# Patient Record
Sex: MALE | Race: WHITE | NOT HISPANIC OR LATINO | Employment: OTHER | URBAN - NONMETROPOLITAN AREA
[De-identification: names, ages, dates, MRNs, and addresses within clinical notes are randomized per-mention and may not be internally consistent; named-entity substitution may affect disease eponyms.]

---

## 2023-10-09 ENCOUNTER — TELEPHONE (OUTPATIENT)
Dept: PAIN MEDICINE | Facility: HOSPITAL | Age: 78
End: 2023-10-09
Payer: MEDICARE

## 2023-10-09 PROBLEM — M70.61 GREATER TROCHANTERIC BURSITIS OF RIGHT HIP: Status: ACTIVE | Noted: 2023-10-09

## 2023-10-09 RX ORDER — SIMVASTATIN 20 MG/1
20 TABLET, FILM COATED ORAL NIGHTLY
COMMUNITY
Start: 2022-12-18 | End: 2023-12-06 | Stop reason: ALTCHOICE

## 2023-10-09 RX ORDER — AMLODIPINE BESYLATE 5 MG/1
5 TABLET ORAL DAILY
COMMUNITY
Start: 2023-02-17 | End: 2023-10-25

## 2023-10-09 NOTE — TELEPHONE ENCOUNTER
Pt called and said he has an appointment on the 25th but over the weekend he developed a trigger finger on his right hand  and would like to know if he could go to another physician if he could get in sooner,for a Cortizone injection  please advise.

## 2023-10-10 ENCOUNTER — OFFICE VISIT (OUTPATIENT)
Dept: ORTHOPEDIC SURGERY | Facility: CLINIC | Age: 78
End: 2023-10-10
Payer: MEDICARE

## 2023-10-10 DIAGNOSIS — M65.341 TRIGGER RING FINGER OF RIGHT HAND: Primary | ICD-10-CM

## 2023-10-10 PROCEDURE — 76942 ECHO GUIDE FOR BIOPSY: CPT | Performed by: ORTHOPAEDIC SURGERY

## 2023-10-10 PROCEDURE — 1125F AMNT PAIN NOTED PAIN PRSNT: CPT | Performed by: ORTHOPAEDIC SURGERY

## 2023-10-10 PROCEDURE — 1159F MED LIST DOCD IN RCRD: CPT | Performed by: ORTHOPAEDIC SURGERY

## 2023-10-10 PROCEDURE — 99203 OFFICE O/P NEW LOW 30 MIN: CPT | Performed by: ORTHOPAEDIC SURGERY

## 2023-10-10 PROCEDURE — 99213 OFFICE O/P EST LOW 20 MIN: CPT | Performed by: ORTHOPAEDIC SURGERY

## 2023-10-10 PROCEDURE — 1036F TOBACCO NON-USER: CPT | Performed by: ORTHOPAEDIC SURGERY

## 2023-10-10 PROCEDURE — 20550 NJX 1 TENDON SHEATH/LIGAMENT: CPT | Performed by: ORTHOPAEDIC SURGERY

## 2023-10-10 RX ORDER — METHYLPREDNISOLONE ACETATE 40 MG/ML
40 INJECTION, SUSPENSION INTRA-ARTICULAR; INTRALESIONAL; INTRAMUSCULAR; SOFT TISSUE ONCE
Status: DISCONTINUED | OUTPATIENT
Start: 2023-10-10 | End: 2023-12-06 | Stop reason: ALTCHOICE

## 2023-10-10 RX ORDER — ATORVASTATIN CALCIUM 20 MG/1
20 TABLET, FILM COATED ORAL
COMMUNITY

## 2023-10-10 ASSESSMENT — ENCOUNTER SYMPTOMS
SHORTNESS OF BREATH: 0
FEVER: 0
ARTHRALGIAS: 0
LIGHT-HEADEDNESS: 0
JOINT SWELLING: 0
COLOR CHANGE: 0
CHILLS: 0
WHEEZING: 0
FATIGUE: 0
TROUBLE SWALLOWING: 0

## 2023-10-10 ASSESSMENT — PAIN - FUNCTIONAL ASSESSMENT: PAIN_FUNCTIONAL_ASSESSMENT: 0-10

## 2023-10-10 ASSESSMENT — PAIN SCALES - GENERAL: PAINLEVEL_OUTOF10: 8

## 2023-10-10 NOTE — PROGRESS NOTES
Reason for Appointment  Chief Complaint   Patient presents with    Right Ring Finger - Trigger Finger     History of Present Illness  New patient is a 78 y.o. male here today for evaluation of of his right ring finger.  He had classic triggering symptoms for the last few months.  He is an avid golfer and the finger will lock down on him with heavy gripping and he has pain with gripping a club.  He has not had any trigger fingers in the past, no recent injuries or falls.  No numbness or tingling in the hand    History reviewed. No pertinent past medical history.    Past Surgical History:   Procedure Laterality Date    ELBOW SURGERY      ROTATOR CUFF REPAIR         Medication Documentation Review Audit       Reviewed by Leslie Dc MA (Medical Assistant) on 10/10/23 at 1015      Medication Order Taking? Sig Documenting Provider Last Dose Status   amLODIPine (Norvasc) 5 mg tablet [id number] Yes Take 1 tablet (5 mg) by mouth once daily. Historical Provider, MD Taking Active   atorvastatin (Lipitor) 20 mg tablet [id number]  Take 1 tablet (20 mg) by mouth once daily. Historical Provider, MD  Active   simvastatin (Zocor) 20 mg tablet [id number] No Take 1 tablet (20 mg) by mouth once daily at bedtime. Historical Provider, MD Not Taking Active                    No Known Allergies    Review of Systems   Constitutional:  Negative for chills, fatigue and fever.   HENT:  Negative for trouble swallowing.    Respiratory:  Negative for shortness of breath and wheezing.    Cardiovascular:  Negative for chest pain and leg swelling.   Musculoskeletal:  Negative for arthralgias and joint swelling.   Skin:  Negative for color change and rash.   Neurological:  Negative for syncope and light-headedness.       Exam   On exam patient is alert, oriented, and in no acute distress.  Head is normocephalic, no JVD, no auditory wheezes.  He has good motion of the shoulders, elbows, and wrist.  DJD in the hands with some stiffness with  composite flexion right hand greater than left.  He has active triggering of the right ring finger, tenderness over the right ring A1 pulley tendon sheath.  Good pulses and sensation in the upper extremity    Assessment   Right ring trigger finger    Plan     We discussed conservative treatment today with an injection and night splinting to calm down his symptoms.  We sterilely injected under ultrasound guidance Depo-Medrol lidocaine into the right ring finger A1 pulley tendon sheath.  Patient understands the small risk of infection and the signs look for as well as flare reaction.  Hopefully this gives him significant relief.  He can follow-up with us as needed    Written by Flores Michele saw, evaluated, and treated the patient with the PA    Procedures  After discussing the risks and benefits of the procedure we proceeded with the injection. Under ultrasound guidance we identified the metacarpal bone and overlying tendon sheath with the FDS and FDP tendons, images obtained. We then sterilely injected the right ring A1 pulley with a mixture of 20 mg of DepoMedrol and .5 cc of 1% lidocaine. Pt tolerated the procedure well without any adverse reactions.

## 2023-10-25 ENCOUNTER — OFFICE VISIT (OUTPATIENT)
Dept: PAIN MEDICINE | Facility: HOSPITAL | Age: 78
End: 2023-10-25
Payer: MEDICARE

## 2023-10-25 VITALS
TEMPERATURE: 97 F | HEART RATE: 60 BPM | SYSTOLIC BLOOD PRESSURE: 195 MMHG | DIASTOLIC BLOOD PRESSURE: 95 MMHG | BODY MASS INDEX: 26.52 KG/M2 | HEIGHT: 68 IN | WEIGHT: 175 LBS | RESPIRATION RATE: 17 BRPM

## 2023-10-25 DIAGNOSIS — Z79.899 MEDICATION MANAGEMENT: ICD-10-CM

## 2023-10-25 DIAGNOSIS — M47.817 FACET ARTHROPATHY, LUMBOSACRAL: ICD-10-CM

## 2023-10-25 DIAGNOSIS — M54.16 LUMBAR RADICULOPATHY, CHRONIC: Primary | ICD-10-CM

## 2023-10-25 DIAGNOSIS — M48.062 LUMBAR STENOSIS WITH NEUROGENIC CLAUDICATION: ICD-10-CM

## 2023-10-25 DIAGNOSIS — M54.50 CHRONIC BILATERAL LOW BACK PAIN WITHOUT SCIATICA: ICD-10-CM

## 2023-10-25 DIAGNOSIS — G89.29 CHRONIC BILATERAL LOW BACK PAIN WITHOUT SCIATICA: ICD-10-CM

## 2023-10-25 LAB
AMPHETAMINES UR QL SCN: NORMAL
BARBITURATES UR QL SCN: NORMAL
BENZODIAZ UR QL SCN: NORMAL
BZE UR QL SCN: NORMAL
CANNABINOIDS UR QL SCN: NORMAL
FENTANYL+NORFENTANYL UR QL SCN: NORMAL
OPIATES UR QL SCN: NORMAL
OXYCODONE+OXYMORPHONE UR QL SCN: NORMAL
PCP UR QL SCN: NORMAL

## 2023-10-25 PROCEDURE — 99213 OFFICE O/P EST LOW 20 MIN: CPT | Mod: ZK | Performed by: NURSE PRACTITIONER

## 2023-10-25 PROCEDURE — 1159F MED LIST DOCD IN RCRD: CPT | Performed by: NURSE PRACTITIONER

## 2023-10-25 PROCEDURE — 1036F TOBACCO NON-USER: CPT | Performed by: NURSE PRACTITIONER

## 2023-10-25 PROCEDURE — 1160F RVW MEDS BY RX/DR IN RCRD: CPT | Performed by: NURSE PRACTITIONER

## 2023-10-25 PROCEDURE — 80307 DRUG TEST PRSMV CHEM ANLYZR: CPT | Performed by: NURSE PRACTITIONER

## 2023-10-25 PROCEDURE — 99203 OFFICE O/P NEW LOW 30 MIN: CPT | Performed by: NURSE PRACTITIONER

## 2023-10-25 PROCEDURE — 1125F AMNT PAIN NOTED PAIN PRSNT: CPT | Performed by: NURSE PRACTITIONER

## 2023-10-25 RX ORDER — DORZOLAMIDE HYDROCHLORIDE AND TIMOLOL MALEATE 20; 5 MG/ML; MG/ML
1 SOLUTION/ DROPS OPHTHALMIC 2 TIMES DAILY
COMMUNITY

## 2023-10-25 RX ORDER — LOSARTAN POTASSIUM 50 MG/1
100 TABLET ORAL DAILY
COMMUNITY

## 2023-10-25 RX ORDER — CALCIUM CARBONATE/VITAMIN D3 250-3.125
1 TABLET ORAL
COMMUNITY

## 2023-10-25 RX ORDER — TRAVOPROST OPHTHALMIC SOLUTION 0.04 MG/ML
1 SOLUTION OPHTHALMIC NIGHTLY
COMMUNITY

## 2023-10-25 ASSESSMENT — ENCOUNTER SYMPTOMS
NEUROLOGICAL NEGATIVE: 1
MYALGIAS: 1
NECK PAIN: 0
NECK STIFFNESS: 0
CONSTITUTIONAL NEGATIVE: 1
JOINT SWELLING: 0
GASTROINTESTINAL NEGATIVE: 1
BACK PAIN: 1
PSYCHIATRIC NEGATIVE: 1
ALLERGIC/IMMUNOLOGIC NEGATIVE: 1
RESPIRATORY NEGATIVE: 1
EYES NEGATIVE: 1
CARDIOVASCULAR NEGATIVE: 1
ENDOCRINE NEGATIVE: 1
ARTHRALGIAS: 1

## 2023-10-25 NOTE — PROGRESS NOTES
I have personally reviewed the OARRS report for Johan Humphreys   . I have considered the risks of abuse, dependence, addiction and diversion.   Is the patient prescribed a combination of a benzodiazepine and opioid? No  Patient tolerating without AE. Benefit outweighs the risk. Discussed risks/benefits with patient. All questions answered. States understanding.     Date of the last Controlled Substance Agreement: 10/25/2023    Last urine drug screening date/ordered today: 10/25/23  Results of last screen: Results as expected.     OPIOID   What is the patient’s goal of therapy? Pain control.  Is this being achieved with current treatment? Yes  Attestation statement: I feel that it is clinically indicated to continue this current medication regimen after consideration of alternative therapies, and other non-opioid treatments.     Opioid Risk Screening:   THE OPIOID RISK TOOL (ORT)                               Female      Male  Alcohol                            [1]            [3] = 0  Illegal Drugs                           [2]            [3]  = 0  1. Family History of Substance Abuse  Prescription Drugs                           [4]           [4]  = 0  Alcohol                           [3]           [3]   =0  Illicit Drugs                           [4]           [4]   =  2. Personal History of Substance Abuse  Prescription Drugs                          [5]           [5]   =0  3. Age (If between 16 to 45)                          [1]           [1]   =0  4. History of Preadolescent Sexual Abuse                         [3]            [0]   =0  ADD, OCD, Bipolar, Schizophrenia                         [2]           [2]   =0  5. Psychological Disease  Depression                        [1]           [1]   =0    TOTAL Score =  0     Last opioid risk screening date/ordered today: 10/25/2023  Patient's total score is 0, within range of Low Risk (<= 3).     Reference :  Low Score = 0 to 3  Moderate Score = 4 to 7  High Score =  =8       Pain Scale Screening:   Pain Assessment and Documentation Tool (PADT)   Date of Assessment: 10/25/023  Analgesia:   Patient reports her pain level on average during the past week is 0on a 0 - 10 scale.

## 2023-10-25 NOTE — H&P (VIEW-ONLY)
Subjective   Patient ID: Johan Humphreys is a 78 y.o. male who presents for Pain (NP low back, right hip and leg ).    Johan is a pleasant 78-year-old  male who is here to establish care with pain management.  Patient is ambulatory and arrives with his spouse.  He was referred to us by Dr. Wood from [facility] orthopedic.    Patient has chronic lower back pain that goes down to his right leg.  Back pain has been ongoing for many years.  It is also aggravated with certain activities.  He reports right now he has no pain.  Pain comes and goes depending on his level of activities.  When he does have pain he describes it as aching, burning, sharp, shooting, spastic, and stabbing kind of pain.  Pain travels down to the side and back of his right leg. He mentioned that pain ends at the knee area.  He has  pain, stinging, pins and needle sensation to the right leg.  He has no radicular symptoms to the left leg.  He reports that back pain and leg pain is more so at night when he is sleeping.  He reports it wakes him up from a sound sleep.  He tends to lay on his sides.  He mentioned that he has to walk around before the pain goes away but then it comes back after an hour.   Patient denies leg weakness or change in balance.  He denies bowel or bladder incontinence.  He denies recent falls, injuries, or ER visits.  He denies back injury or surgery.  He denies joint replacement.    Patient reports that he used to have pain to his hips.  He was informed it was bursitis.  He had hip injections in the past.  It used to work until it did not.  He mentioned that he had seen 5 different orthopedic surgeons.  Dr. Wood ordered an MRI and he was told that it is coming from his back.    Patient is taking Advil as needed for pain relief.  He had physical therapy in the past and is continuing his home stretching exercise.  He also mentioned that he is fairly active and plays golf.    Patient had MRI of his lumbar spine  that was done 9/19/2023 and I reviewed the results with them using the spine model.  I discussed plan of care including pharmacologic and joint interventional procedure.  Patient does not want any medication.  I discussed lumbar epidural block and transforaminal DAVID.  Patient is in agreement to proceed to the TFESI.  This is a therapeutic procedure done under fluoroscopy.  Questions were answered during this encounter.          Past Medical History  He has no past medical history on file.    Surgical History  Past Surgical History:   Procedure Laterality Date    ELBOW SURGERY      ROTATOR CUFF REPAIR          Social History  He reports that he has quit smoking. His smoking use included cigarettes. He smoked an average of 2 packs per day. He has never used smokeless tobacco. He reports current alcohol use of about 24.0 standard drinks of alcohol per week. He reports that he does not use drugs.    Family History  No family history on file.     Allergies  Patient has no known allergies.      Current Outpatient Medications:     calcium carbonate-vitamin D3 (Oyster Shell) 250 mg-3.125 mcg (125 unit) tablet, Take 1 tablet by mouth 2 times a day with meals. Takes 5,000 units daily, Disp: , Rfl:     atorvastatin (Lipitor) 20 mg tablet, Take 1 tablet (20 mg) by mouth once daily., Disp: , Rfl:     dorzolamide-timoloL (Cosopt) 22.3-6.8 mg/mL ophthalmic solution, Administer 1 drop into both eyes 2 times a day., Disp: , Rfl:     losartan (Cozaar) 50 mg tablet, Take 2 tablets (100 mg) by mouth once daily., Disp: , Rfl:     simvastatin (Zocor) 20 mg tablet, Take 1 tablet (20 mg) by mouth once daily at bedtime., Disp: , Rfl:     travoprost (Travatan Z) 0.004 % drops ophthalmic solution, Administer 1 drop into both eyes once daily at bedtime., Disp: , Rfl:     Current Facility-Administered Medications:     methylPREDNISolone acetate (DEPO-Medrol) injection 40 mg, 40 mg, intramuscular, Once, Flores Clark PA-C     Review of  Systems   Constitutional: Negative.    HENT: Negative.     Eyes: Negative.    Respiratory: Negative.     Cardiovascular: Negative.    Gastrointestinal: Negative.    Endocrine: Negative.    Genitourinary: Negative.    Musculoskeletal:  Positive for arthralgias, back pain and myalgias. Negative for gait problem, joint swelling, neck pain and neck stiffness.   Skin: Negative.    Allergic/Immunologic: Negative.    Neurological: Negative.    Psychiatric/Behavioral: Negative.          Physical Exam  Vitals and nursing note reviewed.   HENT:      Head: Normocephalic.      Nose: Nose normal.   Eyes:      Extraocular Movements: Extraocular movements intact.      Conjunctiva/sclera: Conjunctivae normal.      Pupils: Pupils are equal, round, and reactive to light.   Cardiovascular:      Rate and Rhythm: Normal rate and regular rhythm.   Pulmonary:      Effort: Pulmonary effort is normal.      Breath sounds: Normal breath sounds.   Musculoskeletal:         General: Tenderness present. No swelling, deformity or signs of injury.      Cervical back: No rigidity or tenderness.      Lumbar back: Spasms and tenderness present.      Right lower leg: No edema.      Left lower leg: No edema.      Comments: No neck rigidity.  Full range of motion.  Negative Spurling test.  Negative for paraspinal tenderness at the cervical region.  BUE 5/5, BLE 4/5.  Negative leg raise.  Positive for minimal paraspinal tenderness at the lumbar region bilaterally at L4-L5, L5-S1 with rotation.  With right-sided radicular symptoms that follows L4, L5, S1 distribution.   Skin:     General: Skin is warm and dry.   Neurological:      General: No focal deficit present.      Mental Status: He is alert and oriented to person, place, and time.   Psychiatric:         Mood and Affect: Mood normal.         Behavior: Behavior normal.          Last Recorded Vitals  BP (!) 195/95 (BP Location: Right arm, Patient Position: Sitting, BP Cuff Size: Adult)   Pulse 60    Temp 36.1 °C (97 °F) (Tympanic)   Resp 17   Wt 79.4 kg (175 lb)     Relevant Results    Pain Management Panel          Latest Ref Rng & Units 10/25/2023   Pain Management Panel   Amphetamine Screen, Urine Presumptive Negative Presumptive Negative    Barbiturate Screen, Urine Presumptive Negative Presumptive Negative    Benzodiazepines Screen, Urine Presumptive Negative Presumptive Negative    Fentanyl Screen, Urine Presumptive Negative Presumptive Negative       --------------------  * * *Final Report* * *     DATE OF EXAM: Sep 19 2023  1:12PM      [facility]   0303  -  MRI LUMBAR SPINE WO IVCON  / ACCESSION #  [id number]     PROCEDURE REASON: LUMBAR DEGENERATIVE DISC DISEASE          * * * * Physician Interpretation * * * *      MRI LUMBAR SPINE WO IVCON     CLINICAL HISTORY:  LUMBAR DEGENERATIVE DISC DISEASE     TECHNIQUE:  MRI lumbar spine routine protocol without contrast.   MQ: [id number]     COMPARISON:  None.     RESULT:     Counting reference:  Lumbosacral junction. For the purposes of this   report, L4-5 is considered the level of the iliac crest and assume there   are 5 lumbar-type vertebrae.  Anatomic variant:  None.     Localizer images: Approximately 2.5 cm circumscribed T2 hyperintense   lesion in the right hepatic lobe. Few right-sided renal cysts.     Alignment:  Straightening of the cervical lordosis. Multilevel mild to   moderate intervertebral disc space narrowing.     Bone marrow signal/fracture: Type II degenerative endplate changes at   L2-3 and L3-4. No evidence of confluent abnormal marrow replacement or an   acute fracture.     Conus:  The conus terminates at L1 and is within normal limits of caliber   and morphology. Normal course and caliber of the descending cauda equina   nerve roots.     Soft tissues:  Paraspinal soft tissues are within normal limits.     T11-T12:  Canal and foramina are patent.     T12-L1:  Canal and foramina are patent.     L1-L2:  Annular bulging and facet and ligamentous  hypertrophy without   significant canal or foraminal compromise.     L2-L3:  Diffuse disc bulging with superimposed broad-based shallow disc   protrusion, facet and ligamentous hypertrophy; mild canal stenosis with   narrowing of each subarticular zone, mild bilateral foraminal stenosis.     L3-L4:  Diffuse disc bulging with superimposed broad-based shallow disc   protrusion, facet and ligamentous hypertrophy; patent canal with   narrowing of each subarticular zone, mild bilateral foraminal stenosis.     L4-L5:  Diffuse disc bulging with superimposed broad-based shallow disc   protrusion, facet and ligamentous hypertrophy; patent canal with   narrowing of each subarticular zone, moderate bilateral foraminal   stenosis.     L5-S1: Shallow disc bulging, facet hypertrophy; patent canal, moderate   right and mild left foraminal stenosis.     Sacrum and iliac wings:  The visualized sacrum and iliac wings are within   normal limits.  The presacral soft tissues are normal in appearance.   -------      Assessment/Plan   Problem List Items Addressed This Visit    None  Visit Diagnoses         Codes    Lumbar radiculopathy, chronic    -  Primary M54.16    Relevant Orders    Transforaminal    Lumbar stenosis with neurogenic claudication     M48.062    Relevant Orders    Transforaminal    Facet arthropathy, lumbosacral     M47.817    Chronic bilateral low back pain without sciatica     M54.50, G89.29    Medication management     Z79.899    Relevant Orders    Drug Screen, Urine With Reflex to Confirmation                       1. Lumbar radiculopathy, chronic  - Transforaminal; Future    2. Lumbar stenosis with neurogenic claudication  - Transforaminal; Future    3. Facet arthropathy, lumbosacral  Continue taking Advil as needed for pain relief.    4. Chronic bilateral low back pain without sciatica  Continue taking Advil as needed for pain relief.    5. Medication management  - Drug Screen, Urine With Reflex to Confirmation        Plan/Follow-up Instructions:     Continue taking Advil as needed for pain relief.    You are scheduled for right L4-L5 and L5-S1 TFESI #1 on 11/13/2023 in [place] with Dr. Rose.  No Advil on this day.  If getting sedation then you must not eat or drink 6 hours before the procedure and you must of a  with you.  The office will call you for further instructions.  You will then be seen for your postprocedure follow-up in 4 to 6 weeks.      Disclaimer: This note was created using voice recognition software. It was not corrected for typographical or grammatical errors, inadvertent word insertion, or any unintended errors. Please feel free to contact me for clarification.        Joleen Deras, GRANT, APRN, FNP-C   [facility]  Office #: [phone]  Fax # [phone]

## 2023-10-25 NOTE — PROGRESS NOTES
Subjective   Patient ID: Johan Humphreys is a 78 y.o. male who presents for Pain (NP low back, right hip and leg ).    Johan is a pleasant 78-year-old  male who is here to establish care with pain management.  Patient is ambulatory and arrives with his spouse.  He was referred to us by Dr. Wood from [facility].    Patient has chronic lower back pain that goes down to his right leg.  Back pain has been ongoing for many years.  It is also aggravated with certain activities.  He reports right now he has no pain.  Pain comes and goes depending on his level of activities.  When he does have pain he describes it as aching, burning, sharp, shooting, spastic, and stabbing kind of pain.  Pain travels down to the side and back of his right leg. He mentioned that pain ends at the knee area.  He has  pain, stinging, pins and needle sensation to the right leg.  He has no radicular symptoms to the left leg.  He reports that back pain and leg pain is more so at night when he is sleeping.  He reports it wakes him up from a sound sleep.  He tends to lay on his sides.  He mentioned that he has to walk around before the pain goes away but then it comes back after an hour.   Patient denies leg weakness or change in balance.  He denies bowel or bladder incontinence.  He denies recent falls, injuries, or ER visits.  He denies back injury or surgery.  He denies joint replacement.    Patient reports that he used to have pain to his hips.  He was informed it was bursitis.  He had hip injections in the past.  It used to work until it did not.  He mentioned that he had seen 5 different orthopedic surgeons.  Dr. Wood ordered an MRI and he was told that it is coming from his back.    Patient is taking Advil as needed for pain relief.  He had physical therapy in the past and is continuing his home stretching exercise.  He also mentioned that he is fairly active and plays golf.    Patient had MRI of his lumbar spine  that was done 9/19/2023 and I reviewed the results with them using the spine model.  I discussed plan of care including pharmacologic and joint interventional procedure.  Patient does not want any medication.  I discussed lumbar epidural block and transforaminal DAVID.  Patient is in agreement to proceed to the TFESI.  This is a therapeutic procedure done under fluoroscopy.  Questions were answered during this encounter.          Past Medical History  He has no past medical history on file.    Surgical History  Past Surgical History:   Procedure Laterality Date    ELBOW SURGERY      ROTATOR CUFF REPAIR          Social History  He reports that he has quit smoking. His smoking use included cigarettes. He smoked an average of 2 packs per day. He has never used smokeless tobacco. He reports current alcohol use of about 24.0 standard drinks of alcohol per week. He reports that he does not use drugs.    Family History  No family history on file.     Allergies  Patient has no known allergies.      Current Outpatient Medications:     calcium carbonate-vitamin D3 (Oyster Shell) 250 mg-3.125 mcg (125 unit) tablet, Take 1 tablet by mouth 2 times a day with meals. Takes 5,000 units daily, Disp: , Rfl:     atorvastatin (Lipitor) 20 mg tablet, Take 1 tablet (20 mg) by mouth once daily., Disp: , Rfl:     dorzolamide-timoloL (Cosopt) 22.3-6.8 mg/mL ophthalmic solution, Administer 1 drop into both eyes 2 times a day., Disp: , Rfl:     losartan (Cozaar) 50 mg tablet, Take 2 tablets (100 mg) by mouth once daily., Disp: , Rfl:     simvastatin (Zocor) 20 mg tablet, Take 1 tablet (20 mg) by mouth once daily at bedtime., Disp: , Rfl:     travoprost (Travatan Z) 0.004 % drops ophthalmic solution, Administer 1 drop into both eyes once daily at bedtime., Disp: , Rfl:     Current Facility-Administered Medications:     methylPREDNISolone acetate (DEPO-Medrol) injection 40 mg, 40 mg, intramuscular, Once, Flores Clark PA-C     Review of  Systems   Constitutional: Negative.    HENT: Negative.     Eyes: Negative.    Respiratory: Negative.     Cardiovascular: Negative.    Gastrointestinal: Negative.    Endocrine: Negative.    Genitourinary: Negative.    Musculoskeletal:  Positive for arthralgias, back pain and myalgias. Negative for gait problem, joint swelling, neck pain and neck stiffness.   Skin: Negative.    Allergic/Immunologic: Negative.    Neurological: Negative.    Psychiatric/Behavioral: Negative.          Physical Exam  Vitals and nursing note reviewed.   HENT:      Head: Normocephalic.      Nose: Nose normal.   Eyes:      Extraocular Movements: Extraocular movements intact.      Conjunctiva/sclera: Conjunctivae normal.      Pupils: Pupils are equal, round, and reactive to light.   Cardiovascular:      Rate and Rhythm: Normal rate and regular rhythm.   Pulmonary:      Effort: Pulmonary effort is normal.      Breath sounds: Normal breath sounds.   Musculoskeletal:         General: Tenderness present. No swelling, deformity or signs of injury.      Cervical back: No rigidity or tenderness.      Lumbar back: Spasms and tenderness present.      Right lower leg: No edema.      Left lower leg: No edema.      Comments: No neck rigidity.  Full range of motion.  Negative Spurling test.  Negative for paraspinal tenderness at the cervical region.  BUE 5/5, BLE 4/5.  Negative leg raise.  Positive for minimal paraspinal tenderness at the lumbar region bilaterally at L4-L5, L5-S1 with rotation.  With right-sided radicular symptoms that follows L4, L5, S1 distribution.   Skin:     General: Skin is warm and dry.   Neurological:      General: No focal deficit present.      Mental Status: He is alert and oriented to person, place, and time.   Psychiatric:         Mood and Affect: Mood normal.         Behavior: Behavior normal.          Last Recorded Vitals  BP (!) 195/95 (BP Location: Right arm, Patient Position: Sitting, BP Cuff Size: Adult)   Pulse 60    Temp 36.1 °C (97 °F) (Tympanic)   Resp 17   Wt 79.4 kg (175 lb)     Relevant Results    Pain Management Panel          Latest Ref Rng & Units 10/25/2023   Pain Management Panel   Amphetamine Screen, Urine Presumptive Negative Presumptive Negative    Barbiturate Screen, Urine Presumptive Negative Presumptive Negative    Benzodiazepines Screen, Urine Presumptive Negative Presumptive Negative    Fentanyl Screen, Urine Presumptive Negative Presumptive Negative       --------------------  * * *Final Report* * *     DATE OF EXAM: Sep 19 2023  1:12PM      [facility]   0303  -  MRI LUMBAR SPINE WO IVCON  / ACCESSION #  [id number]     PROCEDURE REASON: LUMBAR DEGENERATIVE DISC DISEASE          * * * * Physician Interpretation * * * *      MRI LUMBAR SPINE WO IVCON     CLINICAL HISTORY:  LUMBAR DEGENERATIVE DISC DISEASE     TECHNIQUE:  MRI lumbar spine routine protocol without contrast.   MQ: [id number]     COMPARISON:  None.     RESULT:     Counting reference:  Lumbosacral junction. For the purposes of this   report, L4-5 is considered the level of the iliac crest and assume there   are 5 lumbar-type vertebrae.  Anatomic variant:  None.     Localizer images: Approximately 2.5 cm circumscribed T2 hyperintense   lesion in the right hepatic lobe. Few right-sided renal cysts.     Alignment:  Straightening of the cervical lordosis. Multilevel mild to   moderate intervertebral disc space narrowing.     Bone marrow signal/fracture: Type II degenerative endplate changes at   L2-3 and L3-4. No evidence of confluent abnormal marrow replacement or an   acute fracture.     Conus:  The conus terminates at L1 and is within normal limits of caliber   and morphology. Normal course and caliber of the descending cauda equina   nerve roots.     Soft tissues:  Paraspinal soft tissues are within normal limits.     T11-T12:  Canal and foramina are patent.     T12-L1:  Canal and foramina are patent.     L1-L2:  Annular bulging and facet and ligamentous  hypertrophy without   significant canal or foraminal compromise.     L2-L3:  Diffuse disc bulging with superimposed broad-based shallow disc   protrusion, facet and ligamentous hypertrophy; mild canal stenosis with   narrowing of each subarticular zone, mild bilateral foraminal stenosis.     L3-L4:  Diffuse disc bulging with superimposed broad-based shallow disc   protrusion, facet and ligamentous hypertrophy; patent canal with   narrowing of each subarticular zone, mild bilateral foraminal stenosis.     L4-L5:  Diffuse disc bulging with superimposed broad-based shallow disc   protrusion, facet and ligamentous hypertrophy; patent canal with   narrowing of each subarticular zone, moderate bilateral foraminal   stenosis.     L5-S1: Shallow disc bulging, facet hypertrophy; patent canal, moderate   right and mild left foraminal stenosis.     Sacrum and iliac wings:  The visualized sacrum and iliac wings are within   normal limits.  The presacral soft tissues are normal in appearance.   -------      Assessment/Plan   Problem List Items Addressed This Visit    None  Visit Diagnoses         Codes    Lumbar radiculopathy, chronic    -  Primary M54.16    Relevant Orders    Transforaminal    Lumbar stenosis with neurogenic claudication     M48.062    Relevant Orders    Transforaminal    Facet arthropathy, lumbosacral     M47.817    Chronic bilateral low back pain without sciatica     M54.50, G89.29    Medication management     Z79.899    Relevant Orders    Drug Screen, Urine With Reflex to Confirmation                       1. Lumbar radiculopathy, chronic  - Transforaminal; Future    2. Lumbar stenosis with neurogenic claudication  - Transforaminal; Future    3. Facet arthropathy, lumbosacral  Continue taking Advil as needed for pain relief.    4. Chronic bilateral low back pain without sciatica  Continue taking Advil as needed for pain relief.    5. Medication management  - Drug Screen, Urine With Reflex to Confirmation        Plan/Follow-up Instructions:     Continue taking Advil as needed for pain relief.    You are scheduled for right L4-L5 and L5-S1 TFESI #1 on 11/13/2023 in [place] with Dr. Rose.  No Advil on this day.  If getting sedation then you must not eat or drink 6 hours before the procedure and you must of a  with you.  The office will call you for further instructions.  You will then be seen for your postprocedure follow-up in 4 to 6 weeks.      Disclaimer: This note was created using voice recognition software. It was not corrected for typographical or grammatical errors, inadvertent word insertion, or any unintended errors. Please feel free to contact me for clarification.        Joleen Deras, GRANT, APRN, FNP-C   [facility]  Office #: [phone]  Fax # [phone]

## 2023-10-25 NOTE — PATIENT INSTRUCTIONS
Continue taking Advil as needed for pain relief.    You are scheduled for right L4-L5 and L5-S1 TFESI #1 on 11/13/2023 in [place] with Dr. Rose.  No Advil on this day.  If getting sedation then you must not eat or drink 6 hours before the procedure and you must of a  with you.  The office will call you for further instructions.  You will then be seen for your postprocedure follow-up in 4 to 6 weeks.

## 2023-11-09 PROBLEM — M54.16 CHRONIC LUMBAR RADICULOPATHY: Status: ACTIVE | Noted: 2023-11-09

## 2023-11-09 PROBLEM — M48.062 LUMBAR STENOSIS WITH NEUROGENIC CLAUDICATION: Status: ACTIVE | Noted: 2023-11-09

## 2023-11-13 ENCOUNTER — APPOINTMENT (OUTPATIENT)
Dept: PAIN MEDICINE | Facility: HOSPITAL | Age: 78
End: 2023-11-13
Payer: MEDICARE

## 2023-11-13 ENCOUNTER — HOSPITAL ENCOUNTER (OUTPATIENT)
Dept: PAIN MEDICINE | Facility: HOSPITAL | Age: 78
Discharge: HOME | End: 2023-11-13
Payer: MEDICARE

## 2023-11-13 ENCOUNTER — HOSPITAL ENCOUNTER (OUTPATIENT)
Dept: RADIOLOGY | Facility: HOSPITAL | Age: 78
Discharge: HOME | End: 2023-11-13
Payer: MEDICARE

## 2023-11-13 VITALS
BODY MASS INDEX: 26.52 KG/M2 | SYSTOLIC BLOOD PRESSURE: 131 MMHG | WEIGHT: 175 LBS | OXYGEN SATURATION: 94 % | DIASTOLIC BLOOD PRESSURE: 80 MMHG | HEART RATE: 60 BPM | RESPIRATION RATE: 18 BRPM | HEIGHT: 68 IN | TEMPERATURE: 98.4 F

## 2023-11-13 DIAGNOSIS — M54.16 LUMBAR RADICULOPATHY, CHRONIC: ICD-10-CM

## 2023-11-13 DIAGNOSIS — M48.062 LUMBAR STENOSIS WITH NEUROGENIC CLAUDICATION: ICD-10-CM

## 2023-11-13 DIAGNOSIS — M54.16 CHRONIC LUMBAR RADICULOPATHY: Primary | ICD-10-CM

## 2023-11-13 DIAGNOSIS — M54.16 LUMBAR RADICULOPATHY: Primary | ICD-10-CM

## 2023-11-13 PROCEDURE — 2550000001 HC RX 255 CONTRASTS: Performed by: ANESTHESIOLOGY

## 2023-11-13 PROCEDURE — 7100000010 HC PHASE TWO TIME - EACH INCREMENTAL 1 MINUTE

## 2023-11-13 PROCEDURE — 7100000009 HC PHASE TWO TIME - INITIAL BASE CHARGE

## 2023-11-13 PROCEDURE — 64483 NJX AA&/STRD TFRM EPI L/S 1: CPT | Performed by: ANESTHESIOLOGY

## 2023-11-13 PROCEDURE — 77003 FLUOROGUIDE FOR SPINE INJECT: CPT

## 2023-11-13 PROCEDURE — 64483 NJX AA&/STRD TFRM EPI L/S 1: CPT

## 2023-11-13 PROCEDURE — 2500000004 HC RX 250 GENERAL PHARMACY W/ HCPCS (ALT 636 FOR OP/ED): Performed by: ANESTHESIOLOGY

## 2023-11-13 PROCEDURE — 64484 NJX AA&/STRD TFRM EPI L/S EA: CPT

## 2023-11-13 PROCEDURE — 64484 NJX AA&/STRD TFRM EPI L/S EA: CPT | Performed by: ANESTHESIOLOGY

## 2023-11-13 PROCEDURE — 2500000004 HC RX 250 GENERAL PHARMACY W/ HCPCS (ALT 636 FOR OP/ED): Performed by: NURSE PRACTITIONER

## 2023-11-13 RX ORDER — SODIUM CHLORIDE, SODIUM LACTATE, POTASSIUM CHLORIDE, CALCIUM CHLORIDE 600; 310; 30; 20 MG/100ML; MG/100ML; MG/100ML; MG/100ML
100 INJECTION, SOLUTION INTRAVENOUS CONTINUOUS
Status: DISCONTINUED | OUTPATIENT
Start: 2023-11-13 | End: 2023-11-14 | Stop reason: HOSPADM

## 2023-11-13 RX ORDER — MIDAZOLAM HYDROCHLORIDE 1 MG/ML
INJECTION, SOLUTION INTRAMUSCULAR; INTRAVENOUS AS NEEDED
Status: COMPLETED | OUTPATIENT
Start: 2023-11-13 | End: 2023-11-13

## 2023-11-13 RX ORDER — AMLODIPINE BESYLATE 5 MG/1
5 TABLET ORAL DAILY
COMMUNITY

## 2023-11-13 RX ORDER — FENTANYL CITRATE 50 UG/ML
INJECTION, SOLUTION INTRAMUSCULAR; INTRAVENOUS AS NEEDED
Status: COMPLETED | OUTPATIENT
Start: 2023-11-13 | End: 2023-11-13

## 2023-11-13 RX ADMIN — FENTANYL CITRATE 100 MCG: 50 INJECTION, SOLUTION INTRAMUSCULAR; INTRAVENOUS at 12:05

## 2023-11-13 RX ADMIN — IOHEXOL 3 ML: 240 INJECTION, SOLUTION INTRATHECAL; INTRAVASCULAR; INTRAVENOUS; ORAL at 12:10

## 2023-11-13 RX ADMIN — MIDAZOLAM 2 MG: 1 INJECTION INTRAMUSCULAR; INTRAVENOUS at 12:05

## 2023-11-13 RX ADMIN — SODIUM CHLORIDE, POTASSIUM CHLORIDE, SODIUM LACTATE AND CALCIUM CHLORIDE 100 ML/HR: 600; 310; 30; 20 INJECTION, SOLUTION INTRAVENOUS at 11:31

## 2023-11-13 ASSESSMENT — PAIN - FUNCTIONAL ASSESSMENT
PAIN_FUNCTIONAL_ASSESSMENT: 0-10
PAIN_FUNCTIONAL_ASSESSMENT: 0-10

## 2023-11-13 ASSESSMENT — COLUMBIA-SUICIDE SEVERITY RATING SCALE - C-SSRS
6. HAVE YOU EVER DONE ANYTHING, STARTED TO DO ANYTHING, OR PREPARED TO DO ANYTHING TO END YOUR LIFE?: NO
2. HAVE YOU ACTUALLY HAD ANY THOUGHTS OF KILLING YOURSELF?: NO
1. IN THE PAST MONTH, HAVE YOU WISHED YOU WERE DEAD OR WISHED YOU COULD GO TO SLEEP AND NOT WAKE UP?: NO

## 2023-11-13 ASSESSMENT — PAIN SCALES - GENERAL
PAINLEVEL_OUTOF10: 3
PAINLEVEL_OUTOF10: 0 - NO PAIN

## 2023-11-13 ASSESSMENT — PAIN DESCRIPTION - DESCRIPTORS: DESCRIPTORS: BURNING;STABBING

## 2023-11-13 NOTE — OP NOTE
Date: 2023  OR Location: CON NON-OR PROCEDURES    Name: Johan Humphreys, : 1945, Age: 78 y.o., MRN: [id number], Sex: male    Diagnosis   1. Chronic lumbar radiculopathy        2. Lumbar stenosis with neurogenic claudication  Transforaminal    Transforaminal      3. Lumbar radiculopathy, chronic  Transforaminal    Transforaminal        Patient has no changes in health medical management or allergies since last visit on 10/25/2023    Preop diagnosis: Lumbosacral radiculopathy  Postop diagnosis: Same  Operation performed:  #1  Right L4-5 L5-S1 transforaminal epidural steroid injection with fluoroscopic guidance.   #2 epidurography    Procedures   ANESTHESIA: IV conscious sedation  ESTIMATED BLOOD LOSS: None  COMPLICATIONS: None  PROCEDURE: A 22-gauge IV was started in the patient's left hand. The patient was taken to the operating room, placed in the prone position where sterile prep and drapes were done. Supplemental oxygen was given to the patient at 2 L per minute. Blood pressure and pulse ox remained stable throughout the case. Using fluoroscopic guidance, a single 25-gauge 3-1/2 inch spinal needle was inserted into the neural foramen at L4 and L5 on the right.  A cross table lateral identified the needle tip at the vertebral body. Aspiration was negative of cerebrospinal fluid or blood.  One mL of Omnipaque 300 mg contrast media was injected into each spinal needle. The L4 and L5 nerve roots and epidural space was visualized in both AP and each lateral views with the fluoroscope. One mL of 2% xylocaine MPF and 6 mg of Celestone was injected into each of the 2 needles. The needles were then removed and sterile bandage was applied along with Neosporin ointment to the puncture sites. The patient was taken to the recovery room with no neurologic deficits or pain noted. The patient is scheduled for a follow-up visit.    The patient tolerated the procedure very well and was transferred to the Recovery Room in  stable condition.  The patient had stable resolution of symptoms.  Patient to follow-up in the [facility] as scheduled.

## 2023-11-13 NOTE — DISCHARGE INSTRUCTIONS
No heavy lifting or strenuous activity today.  Do not sign important documents.  Do not drive for 24 hours.  Keep bandage on for 24 hours.  Keep injection site clean and dry, it may be sore for up to 48 hours.  Do not smoke or drink alcohol for 24 hours.   For relief of pain and swelling, you may apply ice to the injection site area.  If pain persist you may apply moist heat.  Common side effects of steroids include insomnia, facial flushing, increased appetite, headaches, sweating, fluid retention. If you have diabetes your blood sugar may increase. Please monitor your diet and your blood sugar should return to normal in a few days. If your sugar becomes dangerously high, please contact your physician that manages your diabetes for further guidance.  Rare side effects include infection, bleeding, nerve damage. If you have fever, redness or swelling near site, severe pain, please go to the nearest emergency room. For other questions please call office at [phone]. Local anesthetics wear off in several hours and duration of relief vary from person to person.

## 2023-11-28 ENCOUNTER — TELEPHONE (OUTPATIENT)
Dept: PAIN MEDICINE | Facility: HOSPITAL | Age: 78
End: 2023-11-28
Payer: MEDICARE

## 2023-12-06 ENCOUNTER — OFFICE VISIT (OUTPATIENT)
Dept: PAIN MEDICINE | Facility: HOSPITAL | Age: 78
End: 2023-12-06
Payer: MEDICARE

## 2023-12-06 VITALS
WEIGHT: 179 LBS | BODY MASS INDEX: 27.13 KG/M2 | HEIGHT: 68 IN | HEART RATE: 77 BPM | SYSTOLIC BLOOD PRESSURE: 144 MMHG | DIASTOLIC BLOOD PRESSURE: 85 MMHG | TEMPERATURE: 97.5 F

## 2023-12-06 DIAGNOSIS — M47.817 FACET ARTHROPATHY, LUMBOSACRAL: ICD-10-CM

## 2023-12-06 DIAGNOSIS — M48.062 LUMBAR STENOSIS WITH NEUROGENIC CLAUDICATION: ICD-10-CM

## 2023-12-06 DIAGNOSIS — M54.16 LUMBAR RADICULOPATHY, CHRONIC: Primary | ICD-10-CM

## 2023-12-06 PROCEDURE — 99214 OFFICE O/P EST MOD 30 MIN: CPT | Mod: ZK | Performed by: NURSE PRACTITIONER

## 2023-12-06 RX ORDER — GABAPENTIN 100 MG/1
100 CAPSULE ORAL NIGHTLY
Qty: 90 CAPSULE | Refills: 0 | Status: SHIPPED | OUTPATIENT
Start: 2023-12-06 | End: 2024-01-24

## 2023-12-06 ASSESSMENT — ENCOUNTER SYMPTOMS
MYALGIAS: 1
ALLERGIC/IMMUNOLOGIC NEGATIVE: 1
NECK STIFFNESS: 0
CONSTITUTIONAL NEGATIVE: 1
JOINT SWELLING: 0
GASTROINTESTINAL NEGATIVE: 1
EYES NEGATIVE: 1
PSYCHIATRIC NEGATIVE: 1
RESPIRATORY NEGATIVE: 1
CARDIOVASCULAR NEGATIVE: 1
NECK PAIN: 0
NEUROLOGICAL NEGATIVE: 1
ARTHRALGIAS: 1
ENDOCRINE NEGATIVE: 1
BACK PAIN: 1

## 2023-12-06 ASSESSMENT — PAIN SCALES - GENERAL: PAINLEVEL: 10-WORST PAIN EVER

## 2023-12-06 NOTE — H&P (VIEW-ONLY)
Subjective   Patient ID: Johan Humphreys is a 78 y.o. male who presents for Pain (Post procedure follow up).    Johan is a pleasant 78-year-old  male who is here for postprocedure follow-up.  Patient is ambulatory.  Gait is steady.  He arrives with his spouse.    Patient had right L4-L5 and L5-S1 TFESI on 11/13/2023.  The procedure has improved his back pain, leg pain and hip pain.  He reports it provided 100% relief for the first 10 days then slowly wearing off.  He reports he had 80% for 3 weeks.  The injection has improved his pain, sleep, ability to participate in his daily activities, and ability to enjoy social activities.  He reports he was less grumpy as he is able to get a good night sleep.  He mentioned that before the injection he used to get a good sleep while in the recliner.  After the injection he was able to sleep in his own bed and did not suffer back and leg pain while turning to the sides.    Patient reports he continues to have right-sided leg pain that is mostly at night.  It comes and goes depending on his position.  He reports at night his pain is a 10.  It is tolerable in the morning.  He describes it as burning, sharp, shooting and stabbing kind of pain.  He continues to have numbness and stinging sensation to his right leg.  He has no pain, numbness or tingling sensation to his left leg.  He reports prolonged standing or walking aggravates his back and right leg pain.  He can only stand for 10 minutes and has to sit down.  Back and leg pain interferes with his daily activities.  Patient denies leg weakness or change in balance.  He denies bowel or bladder incontinence.  He denies recent falls, injuries, or ER visits.  There has been no change since he was last seen.    Patient takes Tylenol and ibuprofen for pain relief. He had physical therapy in the past and is continuing his home stretching exercise.      I discussed the plan of care including pharmacologic and joint interventional  procedure.  He is in agreement to repeating the transforaminal DAVID under fluoroscopy.  Questions were answered during this encounter.    VALERIE was done today and he scored 30.  This form was scanned and included in this note.    --------------  11/13/2023: Right L4-L5 and L5-S1 TFESI #1  --------        Past Medical History  He has a past medical history of High cholesterol and HTN (hypertension).    Surgical History  Past Surgical History:   Procedure Laterality Date    CATARACT EXTRACTION, BILATERAL Bilateral     ELBOW SURGERY      KNEE ARTHROSCOPY W/ DEBRIDEMENT Bilateral     ROTATOR CUFF REPAIR          Social History  He reports that he has quit smoking. His smoking use included cigarettes. He smoked an average of 2 packs per day. He has never used smokeless tobacco. He reports current alcohol use of about 24.0 standard drinks of alcohol per week. He reports that he does not use drugs.    Family History  No family history on file.     Allergies  Patient has no known allergies.      Current Outpatient Medications:     amLODIPine (Norvasc) 5 mg tablet, Take 1 tablet (5 mg) by mouth once daily., Disp: , Rfl:     atorvastatin (Lipitor) 20 mg tablet, Take 1 tablet (20 mg) by mouth once daily., Disp: , Rfl:     calcium carbonate-vitamin D3 (Oyster Shell) 250 mg-3.125 mcg (125 unit) tablet, Take 1 tablet by mouth 2 times a day with meals. Takes 5,000 units daily, Disp: , Rfl:     dorzolamide-timoloL (Cosopt) 22.3-6.8 mg/mL ophthalmic solution, Administer 1 drop into both eyes 2 times a day., Disp: , Rfl:     losartan (Cozaar) 50 mg tablet, Take 2 tablets (100 mg) by mouth once daily., Disp: , Rfl:     travoprost (Travatan Z) 0.004 % drops ophthalmic solution, Administer 1 drop into both eyes once daily at bedtime., Disp: , Rfl:     gabapentin (Neurontin) 100 mg capsule, Take 1 capsule (100 mg) by mouth once daily at bedtime., Disp: 90 capsule, Rfl: 0  No current facility-administered medications for this visit.      Review of Systems   Constitutional: Negative.    HENT: Negative.     Eyes: Negative.    Respiratory: Negative.     Cardiovascular: Negative.    Gastrointestinal: Negative.    Endocrine: Negative.    Genitourinary: Negative.    Musculoskeletal:  Positive for arthralgias, back pain and myalgias. Negative for gait problem, joint swelling, neck pain and neck stiffness.   Skin: Negative.    Allergic/Immunologic: Negative.    Neurological: Negative.    Psychiatric/Behavioral: Negative.          Physical Exam  Vitals and nursing note reviewed.   HENT:      Head: Normocephalic.      Nose: Nose normal.   Eyes:      Extraocular Movements: Extraocular movements intact.      Conjunctiva/sclera: Conjunctivae normal.      Pupils: Pupils are equal, round, and reactive to light.   Cardiovascular:      Rate and Rhythm: Normal rate and regular rhythm.   Pulmonary:      Effort: Pulmonary effort is normal.      Breath sounds: Normal breath sounds.   Musculoskeletal:         General: Tenderness present. No swelling, deformity or signs of injury.      Cervical back: No rigidity or tenderness.      Lumbar back: Tenderness present.      Right lower leg: No edema.      Left lower leg: No edema.      Comments: Positive right leg raise eliciting back pain.  Negative left leg raise.  Positive for minimal paraspinal tenderness at the lumbar region bilaterally at L4-L5, L5-S1 with rotation.  With right-sided radicular symptoms that follows L4, L5 and S1 distribution.  BUE 5/5, LLE 5/5, RLE 4/5.   Skin:     General: Skin is warm and dry.   Neurological:      General: No focal deficit present.      Mental Status: He is alert and oriented to person, place, and time.   Psychiatric:         Mood and Affect: Mood normal.         Behavior: Behavior normal.          Last Recorded Vitals  /85   Pulse 77   Temp 36.4 °C (97.5 °F) (Temporal)   Wt 81.2 kg (179 lb)     Relevant Results      Pain Management Panel          Latest Ref Rng & Units  10/25/2023   Pain Management Panel   Amphetamine Screen, Urine Presumptive Negative Presumptive Negative    Barbiturate Screen, Urine Presumptive Negative Presumptive Negative    Benzodiazepines Screen, Urine Presumptive Negative Presumptive Negative    Fentanyl Screen, Urine Presumptive Negative Presumptive Negative              Media Information          ------------------  MRI LUMBAR SPINE WO IVCON  Order: [id number]  Impression    IMPRESSION:    Lumbar spondylosis without high-grade canal or foraminal compromise, as  detailed.    Anatomic Lumbar Variant: None. L4-5 is considered the level of the iliac  crest and assume there are 5 lumbar-type vertebrae.          : PSCB    Transcribe Date/Time: Sep 19 2023  1:16P    Dictated by : MARSHA MARINA MD    This examination was interpreted and the report reviewed and  electronically signed by:  MARSHA MARINA MD on Sep 19 2023  1:21PM  EST  Narrative    * * *Final Report* * *    DATE OF EXAM: Sep 19 2023  1:12PM      [facility]   0303  -  MRI LUMBAR SPINE WO IVCON  / ACCESSION #  [id number]    PROCEDURE REASON: LUMBAR DEGENERATIVE DISC DISEASE        * * * * Physician Interpretation * * * *     MRI LUMBAR SPINE WO IVCON    CLINICAL HISTORY:  LUMBAR DEGENERATIVE DISC DISEASE    TECHNIQUE:  MRI lumbar spine routine protocol without contrast.  MQ: [id number]    COMPARISON:  None.    RESULT:    Counting reference:  Lumbosacral junction. For the purposes of this  report, L4-5 is considered the level of the iliac crest and assume there  are 5 lumbar-type vertebrae.  Anatomic variant:  None.    Localizer images: Approximately 2.5 cm circumscribed T2 hyperintense  lesion in the right hepatic lobe. Few right-sided renal cysts.    Alignment:  Straightening of the cervical lordosis. Multilevel mild to  moderate intervertebral disc space narrowing.    Bone marrow signal/fracture: Type II degenerative endplate changes at  L2-3 and L3-4. No evidence of confluent  abnormal marrow replacement or an  acute fracture.    Conus:  The conus terminates at L1 and is within normal limits of caliber  and morphology. Normal course and caliber of the descending cauda equina  nerve roots.    Soft tissues:  Paraspinal soft tissues are within normal limits.    T11-T12:  Canal and foramina are patent.    T12-L1:  Canal and foramina are patent.    L1-L2:  Annular bulging and facet and ligamentous hypertrophy without  significant canal or foraminal compromise.    L2-L3:  Diffuse disc bulging with superimposed broad-based shallow disc  protrusion, facet and ligamentous hypertrophy; mild canal stenosis with  narrowing of each subarticular zone, mild bilateral foraminal stenosis.    L3-L4:  Diffuse disc bulging with superimposed broad-based shallow disc  protrusion, facet and ligamentous hypertrophy; patent canal with  narrowing of each subarticular zone, mild bilateral foraminal stenosis.    L4-L5:  Diffuse disc bulging with superimposed broad-based shallow disc  protrusion, facet and ligamentous hypertrophy; patent canal with  narrowing of each subarticular zone, moderate bilateral foraminal  stenosis.    L5-S1:  Shallow disc bulging, facet hypertrophy; patent canal, moderate  right and mild left foraminal stenosis.    Sacrum and iliac wings:  The visualized sacrum and iliac wings are within  normal limits.  The presacral soft tissues are normal in appearance.  ----------------------      Assessment/Plan   Problem List Items Addressed This Visit             ICD-10-CM    Lumbar radiculopathy, chronic - Primary M54.16    Relevant Medications    gabapentin (Neurontin) 100 mg capsule    Other Relevant Orders    Transforaminal    Lumbar stenosis with neurogenic claudication M48.062    Relevant Medications    gabapentin (Neurontin) 100 mg capsule    Other Relevant Orders    Transforaminal    Facet arthropathy, lumbosacral M47.817    Relevant Medications    gabapentin (Neurontin) 100 mg capsule               1. Lumbar radiculopathy, chronic  - gabapentin (Neurontin) 100 mg capsule; Take 1 capsule (100 mg) by mouth once daily at bedtime.  Dispense: 90 capsule; Refill: 0  - Transforaminal; Future    2. Lumbar stenosis with neurogenic claudication  - gabapentin (Neurontin) 100 mg capsule; Take 1 capsule (100 mg) by mouth once daily at bedtime.  Dispense: 90 capsule; Refill: 0  - Transforaminal; Future    3. Facet arthropathy, lumbosacral  - gabapentin (Neurontin) 100 mg capsule; Take 1 capsule (100 mg) by mouth once daily at bedtime.  Dispense: 90 capsule; Refill: 0       Plan/Follow-up Instructions:      started you on gabapentin and you may take 100 mg at bedtime.    Continue taking ibuprofen as needed for pain relief.    You are scheduled for right L4-L5 and L5-S1 TFESI #2 on 12/18/2023 in [place] with Dr. Rose.  No ibuprofen on this day.  If getting sedation then you must not eat or drink 6 hours before the procedure and you must have a  with you.  The office will call you for further instructions.  I will then see you for your postprocedure follow-up in 4 to 6 weeks.      Disclaimer: This note was created using voice recognition software. It was not corrected for typographical or grammatical errors, inadvertent word insertion, or any unintended errors. Please feel free to contact me for clarification.        Joleen Deras, GRANT, APRN, FNP-C   [facility]  Office #: [phone]  Fax # [phone]

## 2023-12-06 NOTE — PATIENT INSTRUCTIONS
I started you on gabapentin and you may take 100 mg at bedtime.    Continue taking ibuprofen as needed for pain relief.    You are scheduled for right L4-L5 and L5-S1 TFESI #2 on 12/18/2023 in [place] with Dr. Rose.  No ibuprofen on this day.  If getting sedation then you must not eat or drink 6 hours before the procedure and you must have a  with you.  The office will call you for further instructions.  I will then see you for your postprocedure follow-up in 4 to 6 weeks.   Complex Repair And A-T Advancement Flap Text: The defect edges were debeveled with a #15 scalpel blade.  The primary defect was closed partially with a complex linear closure.  Given the location of the remaining defect, shape of the defect and the proximity to free margins an A-T advancement flap was deemed most appropriate for complete closure of the defect.  Using a sterile surgical marker, an appropriate advancement flap was drawn incorporating the defect and placing the expected incisions within the relaxed skin tension lines where possible.    The area thus outlined was incised deep to adipose tissue with a #15 scalpel blade.  The skin margins were undermined to an appropriate distance in all directions utilizing iris scissors.

## 2023-12-06 NOTE — PROGRESS NOTES
I have personally reviewed the OARRS report for Johan Humphreys   . I have considered the risks of abuse, dependence, addiction and diversion.   Is the patient prescribed a combination of a benzodiazepine and opioid? No  Patient tolerating without AE. Benefit outweighs the risk. Discussed risks/benefits with patient. All questions answered. States understanding.     Date of the last Controlled Substance Agreement: 10/25/2023       Last urine drug screening date/ordered today: 10/25/2023     Results of last screen: Results as expected.       Last opioid risk screening date/ordered today: 12/6/2023      Pain Scale Screening:   Pain Assessment and Documentation Tool (PADT)   Date of Assessment: 12/6/20223  Analgesia:   Patient reports her pain level on average during the past week is 10on a 0 - 10 scale.   Patient reports that her pain level at its worst during the past week was 10 on a 0 -10 scale.   50% of pain has been relieved during the past week per patient   Patient states that the amount of pain relief she is now obtaining from her current pain reliever(s) is enough to make a real difference in her life.   Query to clinician: Is the patient's pain relief clinically significant? yes  Activities of Daily Living:   Physical functioning: unchanged  Family relationships: unchanged  Social relationships: unchanged  Mood: unchanged  Sleep patterns: unchanged  Overall functioning: unchanged  Adverse Events: No, Johan Humphreys is not experiencing side effects from current pain reliever.  Patients overall severity of side effect:none  Specific Analgesic Plan: Continue present regimen.

## 2023-12-18 ENCOUNTER — HOSPITAL ENCOUNTER (OUTPATIENT)
Dept: PAIN MEDICINE | Facility: HOSPITAL | Age: 78
Discharge: HOME | End: 2023-12-18
Payer: MEDICARE

## 2023-12-18 ENCOUNTER — APPOINTMENT (OUTPATIENT)
Dept: PAIN MEDICINE | Facility: HOSPITAL | Age: 78
End: 2023-12-18
Payer: MEDICARE

## 2023-12-18 ENCOUNTER — HOSPITAL ENCOUNTER (OUTPATIENT)
Dept: RADIOLOGY | Facility: HOSPITAL | Age: 78
Discharge: HOME | End: 2023-12-18
Payer: MEDICARE

## 2023-12-18 VITALS
HEART RATE: 68 BPM | TEMPERATURE: 96.5 F | SYSTOLIC BLOOD PRESSURE: 154 MMHG | OXYGEN SATURATION: 98 % | DIASTOLIC BLOOD PRESSURE: 92 MMHG | RESPIRATION RATE: 18 BRPM

## 2023-12-18 DIAGNOSIS — M54.16 LUMBAR RADICULOPATHY, CHRONIC: ICD-10-CM

## 2023-12-18 DIAGNOSIS — M48.062 LUMBAR STENOSIS WITH NEUROGENIC CLAUDICATION: ICD-10-CM

## 2023-12-18 PROCEDURE — 64483 NJX AA&/STRD TFRM EPI L/S 1: CPT | Performed by: ANESTHESIOLOGY

## 2023-12-18 PROCEDURE — 2550000001 HC RX 255 CONTRASTS: Performed by: ANESTHESIOLOGY

## 2023-12-18 PROCEDURE — 64483 NJX AA&/STRD TFRM EPI L/S 1: CPT

## 2023-12-18 PROCEDURE — 64484 NJX AA&/STRD TFRM EPI L/S EA: CPT

## 2023-12-18 PROCEDURE — 77003 FLUOROGUIDE FOR SPINE INJECT: CPT

## 2023-12-18 PROCEDURE — 64484 NJX AA&/STRD TFRM EPI L/S EA: CPT | Performed by: ANESTHESIOLOGY

## 2023-12-18 RX ORDER — SODIUM CHLORIDE, SODIUM LACTATE, POTASSIUM CHLORIDE, CALCIUM CHLORIDE 600; 310; 30; 20 MG/100ML; MG/100ML; MG/100ML; MG/100ML
100 INJECTION, SOLUTION INTRAVENOUS CONTINUOUS
Status: DISCONTINUED | OUTPATIENT
Start: 2023-12-18 | End: 2023-12-19 | Stop reason: HOSPADM

## 2023-12-18 RX ADMIN — IOHEXOL 3 ML: 240 INJECTION, SOLUTION INTRATHECAL; INTRAVASCULAR; INTRAVENOUS; ORAL at 11:50

## 2023-12-18 ASSESSMENT — PAIN SCALES - GENERAL
PAINLEVEL_OUTOF10: 0 - NO PAIN
PAINLEVEL_OUTOF10: 3

## 2023-12-18 ASSESSMENT — COLUMBIA-SUICIDE SEVERITY RATING SCALE - C-SSRS
2. HAVE YOU ACTUALLY HAD ANY THOUGHTS OF KILLING YOURSELF?: NO
6. HAVE YOU EVER DONE ANYTHING, STARTED TO DO ANYTHING, OR PREPARED TO DO ANYTHING TO END YOUR LIFE?: NO
1. IN THE PAST MONTH, HAVE YOU WISHED YOU WERE DEAD OR WISHED YOU COULD GO TO SLEEP AND NOT WAKE UP?: NO

## 2023-12-18 ASSESSMENT — PAIN - FUNCTIONAL ASSESSMENT
PAIN_FUNCTIONAL_ASSESSMENT: 0-10
PAIN_FUNCTIONAL_ASSESSMENT: 0-10

## 2023-12-18 NOTE — DISCHARGE INSTRUCTIONS
Discharge Instructions:  Keep band-aid on for the next 24 hours.  No showering/bathing for the next 24 hours. You may notice soreness or increased pain in the area of your injection, which may continue for the first 48 hours.  Avoid driving or operating any heavy machinery until the next day after the procedure.  You should resume any medications and your regular diet after the procedure. Some of the side affects you may experience from the steroids are: Insomnia (inability to sleep), Increased sweating, Headaches, Increased fluid retention (swelling of your extremities), Increased appetite, Face flushing, If you are a diabetic, your blood sugars may go up.  Closely monitor your diet.  Your blood sugar should return to normal in a few days.  Complications: If the discomfort persists, apply moist heat to the area.  Serious complications are very uncommon but may include bleeding, infection or nerve damage. If sever pain, fever, redness or swelling near the injection site, have someone take you to the nearest emergency room to be evaluated for procedure complications or infection. Expectations: Local anesthetics wear off in several hours but duration of relief varies from individual to individual.  If you have any questions, please call the office at [phone].  If this is an emergency, please go to the nearest emergency room.

## 2023-12-18 NOTE — OP NOTE
Date: 2023  OR Location: CON NON-OR PROCEDURES    Name: Johan Humphreys, : 1945, Age: 78 y.o., MRN: [id number], Sex: male    Diagnosis   1. Lumbar radiculopathy, chronic  Transforaminal    Transforaminal      2. Lumbar stenosis with neurogenic claudication  Transforaminal    Transforaminal        Patient has no changes in health medical management or allergies since last visit on 2023    Preop diagnosis: Lumbosacral radiculopathy  Postop diagnosis: Same  Operation performed:  #1  Right L4-5 and L5-S1 transforaminal epidural steroid injection with fluoroscopic guidance.   #2 epidurography    Procedures   ANESTHESIA: Local  ESTIMATED BLOOD LOSS: None  COMPLICATIONS: None  PROCEDURE: A 22-gauge IV was started in the patient's left hand. The patient was taken to the operating room, placed in the prone position where sterile prep and drapes were done. Supplemental oxygen was given to the patient at 2 L per minute. Blood pressure and pulse ox remained stable throughout the case. Using fluoroscopic guidance, a single 25-gauge 3-1/2 inch spinal needle was inserted into the neural foramen at L4 and L5 on the right.  A cross table lateral identified the needle tip at the vertebral body. Aspiration was negative of cerebrospinal fluid or blood.  One mL of Omnipaque 300 mg contrast media was injected into each spinal needle. The L4 and L5 nerve roots and epidural space was visualized in both AP and each lateral views with the fluoroscope. One mL of 2% xylocaine MPF and 6 mg of Celestone was injected into each of the 2 needles. The needles were then removed and sterile bandage was applied along with Neosporin ointment to the puncture sites. The patient was taken to the recovery room with no neurologic deficits or pain noted. The patient is scheduled for a follow-up visit.    The patient tolerated the procedure very well and was transferred to the Recovery Room in stable condition.  The patient had stable resolution  of symptoms.  Patient to follow-up in the [facility] as scheduled.

## 2024-01-24 ENCOUNTER — OFFICE VISIT (OUTPATIENT)
Dept: PAIN MEDICINE | Facility: HOSPITAL | Age: 79
End: 2024-01-24
Payer: MEDICARE

## 2024-01-24 VITALS
BODY MASS INDEX: 27.43 KG/M2 | DIASTOLIC BLOOD PRESSURE: 88 MMHG | WEIGHT: 181 LBS | HEIGHT: 68 IN | SYSTOLIC BLOOD PRESSURE: 171 MMHG | HEART RATE: 71 BPM | TEMPERATURE: 98.1 F

## 2024-01-24 DIAGNOSIS — Z79.899 MEDICATION MANAGEMENT: ICD-10-CM

## 2024-01-24 DIAGNOSIS — M79.10 TRIGGER POINT: ICD-10-CM

## 2024-01-24 DIAGNOSIS — M70.61 GREATER TROCHANTERIC BURSITIS OF RIGHT HIP: Primary | ICD-10-CM

## 2024-01-24 PROBLEM — M79.18 MYOFASCIAL PAIN: Status: ACTIVE | Noted: 2024-01-24

## 2024-01-24 PROCEDURE — 80307 DRUG TEST PRSMV CHEM ANLYZR: CPT | Performed by: NURSE PRACTITIONER

## 2024-01-24 PROCEDURE — 1159F MED LIST DOCD IN RCRD: CPT | Performed by: NURSE PRACTITIONER

## 2024-01-24 PROCEDURE — 1036F TOBACCO NON-USER: CPT | Performed by: NURSE PRACTITIONER

## 2024-01-24 PROCEDURE — 99213 OFFICE O/P EST LOW 20 MIN: CPT | Mod: ZK | Performed by: NURSE PRACTITIONER

## 2024-01-24 PROCEDURE — 1125F AMNT PAIN NOTED PAIN PRSNT: CPT | Performed by: NURSE PRACTITIONER

## 2024-01-24 PROCEDURE — 99213 OFFICE O/P EST LOW 20 MIN: CPT | Performed by: NURSE PRACTITIONER

## 2024-01-24 PROCEDURE — 1160F RVW MEDS BY RX/DR IN RCRD: CPT | Performed by: NURSE PRACTITIONER

## 2024-01-24 ASSESSMENT — PAIN SCALES - GENERAL: PAINLEVEL: 6

## 2024-01-24 ASSESSMENT — ENCOUNTER SYMPTOMS
GASTROINTESTINAL NEGATIVE: 1
ENDOCRINE NEGATIVE: 1
RESPIRATORY NEGATIVE: 1
ALLERGIC/IMMUNOLOGIC NEGATIVE: 1
MYALGIAS: 1
EYES NEGATIVE: 1
NECK PAIN: 0
CONSTITUTIONAL NEGATIVE: 1
NECK STIFFNESS: 0
ARTHRALGIAS: 1
JOINT SWELLING: 0
PSYCHIATRIC NEGATIVE: 1
CARDIOVASCULAR NEGATIVE: 1
NEUROLOGICAL NEGATIVE: 1

## 2024-01-24 NOTE — H&P (VIEW-ONLY)
Subjective   Patient ID: Johan Humphreys is a 78 y.o. male who presents for Follow-up (Post procedure follow up).    Johan is a pleasant 78-year-old  male who is here for postprocedure follow-up.  Patient's ambulatory.  Gait is steady.  He arrives with his spouse.    Patient had right L4-L5 and L5-S1 TFESI #2 on 12/18/2022.  Patient reports the injection did not help with the right leg pain.  He had 60% relief for several days then he is back to where he was.    Patient continues to have right hip pain.  He rates his pain as 6 out of 10 during the daytime but is worse during the night.  He reports it is 10 out of 10.  He describes it as burning, sharp, shooting, spastic and stabbing kind of pain.  He has stinging sensation to the right hip area.  He denies pain, numbness or tingling sensation to his legs.  He denies leg weakness or change in balance.  He denies bowel or bladder incontinence.  He denies recent falls, injuries, or ER visits.  There has been no changes since he was last seen.    Patient continues to take Advil as needed for pain relief.  He was on gabapentin but stopped taking that.  He reports it is not working.  He would like an injection to relieve the right bursitis pain.  He had an injection for his bursitis in the past that worked well.  Questions were answered during this encounter.              Past Medical History  He has a past medical history of High cholesterol and HTN (hypertension).    Surgical History  Past Surgical History:   Procedure Laterality Date    CATARACT EXTRACTION, BILATERAL Bilateral     ELBOW SURGERY      KNEE ARTHROSCOPY W/ DEBRIDEMENT Bilateral     ROTATOR CUFF REPAIR          Social History  He reports that he has quit smoking. His smoking use included cigarettes. He smoked an average of 2 packs per day. He has never used smokeless tobacco. He reports current alcohol use of about 24.0 standard drinks of alcohol per week. He reports that he does not use  drugs.    Family History  No family history on file.     Allergies  Patient has no known allergies.      Current Outpatient Medications:     amLODIPine (Norvasc) 5 mg tablet, Take 1 tablet (5 mg) by mouth once daily., Disp: , Rfl:     atorvastatin (Lipitor) 20 mg tablet, Take 1 tablet (20 mg) by mouth once daily., Disp: , Rfl:     calcium carbonate-vitamin D3 (Oyster Shell) 250 mg-3.125 mcg (125 unit) tablet, Take 1 tablet by mouth 2 times a day with meals. Takes 5,000 units daily, Disp: , Rfl:     dorzolamide-timoloL (Cosopt) 22.3-6.8 mg/mL ophthalmic solution, Administer 1 drop into both eyes 2 times a day., Disp: , Rfl:     losartan (Cozaar) 50 mg tablet, Take 2 tablets (100 mg) by mouth once daily., Disp: , Rfl:     travoprost (Travatan Z) 0.004 % drops ophthalmic solution, Administer 1 drop into both eyes once daily at bedtime., Disp: , Rfl:      Review of Systems   Constitutional: Negative.    HENT: Negative.     Eyes: Negative.    Respiratory: Negative.     Cardiovascular: Negative.    Gastrointestinal: Negative.    Endocrine: Negative.    Genitourinary: Negative.    Musculoskeletal:  Positive for arthralgias and myalgias. Negative for gait problem, joint swelling, neck pain and neck stiffness.   Skin: Negative.    Allergic/Immunologic: Negative.    Neurological: Negative.    Psychiatric/Behavioral: Negative.          Physical Exam  Vitals and nursing note reviewed.   HENT:      Head: Normocephalic.      Nose: Nose normal.   Eyes:      Extraocular Movements: Extraocular movements intact.      Conjunctiva/sclera: Conjunctivae normal.      Pupils: Pupils are equal, round, and reactive to light.   Cardiovascular:      Rate and Rhythm: Normal rate and regular rhythm.   Pulmonary:      Effort: Pulmonary effort is normal.      Breath sounds: Normal breath sounds.   Musculoskeletal:         General: Tenderness present. No swelling, deformity or signs of injury.      Cervical back: No rigidity or tenderness.       Lumbar back: Tenderness present.      Right lower leg: No edema.      Left lower leg: No edema.      Comments: Negative leg raise.  Positive for right Fabere's test eliciting hip pain.  Negative for paraspinal tenderness at the lumbar region.  Negative for tenderness at the left femoral hip joint.  Positive for tenderness on palpation at the right femoral hip joint.  BUE 4-5/5, BLE 4-5/5.   Skin:     General: Skin is warm and dry.   Neurological:      General: No focal deficit present.      Mental Status: He is alert and oriented to person, place, and time.   Psychiatric:         Mood and Affect: Mood normal.         Behavior: Behavior normal.          Last Recorded Vitals  /88   Pulse 71   Temp 36.7 °C (98.1 °F) (Temporal)   Wt 82.1 kg (181 lb)     Relevant Results      Pain Management Panel  More data may exist         Latest Ref Rng & Units 1/24/2024 10/25/2023   Pain Management Panel   Amphetamine Screen, Urine Presumptive Negative Presumptive Negative  Presumptive Negative    Barbiturate Screen, Urine Presumptive Negative Presumptive Negative  Presumptive Negative    Benzodiazepines Screen, Urine Presumptive Negative Presumptive Negative  Presumptive Negative    Fentanyl Screen, Urine Presumptive Negative Presumptive Negative  Presumptive Negative         ---------------------  XR HIP GENERAL 3V PELV/AP/LAT RT  Order: [id number]  Impression    IMPRESSION:    Mild right hip degenerative change.          : NIKKO    Transcribe Date/Time: Sep  2 2022  9:42A    Dictated by : STELLA RODRIGUEZ DO    This examination was interpreted and the report reviewed and  electronically signed by:   STELLA RODRIGUEZ DO on Sep  2 2022  9:44AM  EST  Narrative    * * *Final Report* * *    DATE OF EXAM: Sep  2 2022  9:07AM      AEX   [id number]  -  XR HIP 3V PELV+ AP/LAT RT  / ACCESSION #  [id number]    PROCEDURE REASON: RIGHT HIP PAIN        * * * * Physician Interpretation * * * *     EXAMINATION:  XR HIP 3V PELV+  AP/LAT RT    PATIENT/TECHNOLOGIST PROVIDED HISTORY:   RT HIP PAIN    CLINICAL INFORMATION:  RIGHT HIP PAIN    TECHNIQUE: Pelvis, 1 view; Right hip, 2 views    COMPARISON: None    RESULT:    Mild right hip joint space narrowing inferiorly.  No acute right hip  fracture or dislocation.    Partially evaluated left hip appears similar to the right.    Rounded ossification/enthesophyte adjacent to the right greater  trochanter.  Bony pelvis appears grossly intact.  Sacroiliac joints  appear relatively maintained.  Incompletely evaluated degenerative  changes of the lower lumbar spine.  ------------------      Assessment/Plan   Problem List Items Addressed This Visit             ICD-10-CM    Greater trochanteric bursitis of right hip - Primary M70.61    Relevant Orders    FL pain management TC    Inject Trigger Point, 1 or 2    Trigger point M79.10    Relevant Orders    Inject Trigger Point, 1 or 2     Other Visit Diagnoses         Codes    Medication management     Z79.899    Relevant Orders    Drug Screen, Urine With Reflex to Confirmation (Completed)                   1. Greater trochanteric bursitis of right hip  - FL pain management TC; Future  - Inject Trigger Point, 1 or 2; Future    2. Trigger point  - Inject Trigger Point, 1 or 2; Future    3. Medication management  - Drug Screen, Urine With Reflex to Confirmation; Future  - Drug Screen, Urine With Reflex to Confirmation       Plan/Follow-up Instructions:     You are scheduled for right trochanteric bursitis trigger point injection with fluoroscopy on 1/29/2024 in [place] with Dr. Rose.  No ibuprofen this day.  The office will call you for further instructions.  You will then be seen for your postprocedure follow-up in 6 to 8 weeks.      Disclaimer: This note was created using voice recognition software. It was not corrected for typographical or grammatical errors, inadvertent word insertion, or any unintended errors. Please feel free to contact me for  clarification.        Joleen Deras, GRANT, APRN, FNP-C   [facility]  Office #: [phone]  Fax # [phone]

## 2024-01-24 NOTE — PATIENT INSTRUCTIONS
You are scheduled for right trochanteric bursitis trigger point injection with fluoroscopy on 1/29/2024 in [place] with Dr. Rose.  No ibuprofen this day.  The office will call you for further instructions.  You will then be seen for your postprocedure follow-up in 6 to 8 weeks.

## 2024-01-24 NOTE — PROGRESS NOTES
I have personally reviewed the OARRS report for Johan Humphreys   . I have considered the risks of abuse, dependence, addiction and diversion.   Is the patient prescribed a combination of a benzodiazepine and opioid? No  Patient tolerating without AE. Benefit outweighs the risk. Discussed risks/benefits with patient. All questions answered. States understanding.     Date of the last Controlled Substance Agreement: 10/25/2023       Last urine drug screening date/ordered today: 01/24/24     Results of last screen: Results as expected.       Last opioid risk screening date/ordered today: 10/25/2023      Pain Scale Screening:   Pain Assessment and Documentation Tool (PADT)   Date of Assessment: 1/24/2024  Analgesia:   Patient reports her pain level on average during the past week is 10on a 0 - 10 scale.   Patient reports that her pain level at its worst during the past week was 10 on a 0 -10 scale.   60% of pain has been relieved during the past week per patient   Patient states that the amount of pain relief she is now obtaining from her current pain reliever(s) is enough to make a real difference in her life.   Query to clinician: Is the patient's pain relief clinically significant? no  Activities of Dnoaily Living:   Physical functioning: worse  Family relationships: unchanged  Social relationships: unchanged  Mood: worse  Sleep patterns: worse  Overall functioning: worse  Adverse Events: No, Johan Humphreys is not experiencing side effects from current pain reliever.  Patients overall severity of side effect:none  Specific Analgesic Plan: Continue present regimen.

## 2024-01-24 NOTE — PROGRESS NOTES
Subjective   Patient ID: Johan Humphreys is a 78 y.o. male who presents for Follow-up (Post procedure follow up).    Johan is a pleasant 78-year-old  male who is here for postprocedure follow-up.  Patient's ambulatory.  Gait is steady.  He arrives with his spouse.    Patient had right L4-L5 and L5-S1 TFESI #2 on 12/18/2022.  Patient reports the injection did not help with the right leg pain.  He had 60% relief for several days then he is back to where he was.    Patient continues to have right hip pain.  He rates his pain as 6 out of 10 during the daytime but is worse during the night.  He reports it is 10 out of 10.  He describes it as burning, sharp, shooting, spastic and stabbing kind of pain.  He has stinging sensation to the right hip area.  He denies pain, numbness or tingling sensation to his legs.  He denies leg weakness or change in balance.  He denies bowel or bladder incontinence.  He denies recent falls, injuries, or ER visits.  There has been no changes since he was last seen.    Patient continues to take Advil as needed for pain relief.  He was on gabapentin but stopped taking that.  He reports it is not working.  He would like an injection to relieve the right bursitis pain.  He had an injection for his bursitis in the past that worked well.  Questions were answered during this encounter.              Past Medical History  He has a past medical history of High cholesterol and HTN (hypertension).    Surgical History  Past Surgical History:   Procedure Laterality Date    CATARACT EXTRACTION, BILATERAL Bilateral     ELBOW SURGERY      KNEE ARTHROSCOPY W/ DEBRIDEMENT Bilateral     ROTATOR CUFF REPAIR          Social History  He reports that he has quit smoking. His smoking use included cigarettes. He smoked an average of 2 packs per day. He has never used smokeless tobacco. He reports current alcohol use of about 24.0 standard drinks of alcohol per week. He reports that he does not use  drugs.    Family History  No family history on file.     Allergies  Patient has no known allergies.      Current Outpatient Medications:     amLODIPine (Norvasc) 5 mg tablet, Take 1 tablet (5 mg) by mouth once daily., Disp: , Rfl:     atorvastatin (Lipitor) 20 mg tablet, Take 1 tablet (20 mg) by mouth once daily., Disp: , Rfl:     calcium carbonate-vitamin D3 (Oyster Shell) 250 mg-3.125 mcg (125 unit) tablet, Take 1 tablet by mouth 2 times a day with meals. Takes 5,000 units daily, Disp: , Rfl:     dorzolamide-timoloL (Cosopt) 22.3-6.8 mg/mL ophthalmic solution, Administer 1 drop into both eyes 2 times a day., Disp: , Rfl:     losartan (Cozaar) 50 mg tablet, Take 2 tablets (100 mg) by mouth once daily., Disp: , Rfl:     travoprost (Travatan Z) 0.004 % drops ophthalmic solution, Administer 1 drop into both eyes once daily at bedtime., Disp: , Rfl:      Review of Systems   Constitutional: Negative.    HENT: Negative.     Eyes: Negative.    Respiratory: Negative.     Cardiovascular: Negative.    Gastrointestinal: Negative.    Endocrine: Negative.    Genitourinary: Negative.    Musculoskeletal:  Positive for arthralgias and myalgias. Negative for gait problem, joint swelling, neck pain and neck stiffness.   Skin: Negative.    Allergic/Immunologic: Negative.    Neurological: Negative.    Psychiatric/Behavioral: Negative.          Physical Exam  Vitals and nursing note reviewed.   HENT:      Head: Normocephalic.      Nose: Nose normal.   Eyes:      Extraocular Movements: Extraocular movements intact.      Conjunctiva/sclera: Conjunctivae normal.      Pupils: Pupils are equal, round, and reactive to light.   Cardiovascular:      Rate and Rhythm: Normal rate and regular rhythm.   Pulmonary:      Effort: Pulmonary effort is normal.      Breath sounds: Normal breath sounds.   Musculoskeletal:         General: Tenderness present. No swelling, deformity or signs of injury.      Cervical back: No rigidity or tenderness.       Lumbar back: Tenderness present.      Right lower leg: No edema.      Left lower leg: No edema.      Comments: Negative leg raise.  Positive for right Fabere's test eliciting hip pain.  Negative for paraspinal tenderness at the lumbar region.  Negative for tenderness at the left femoral hip joint.  Positive for tenderness on palpation at the right femoral hip joint.  BUE 4-5/5, BLE 4-5/5.   Skin:     General: Skin is warm and dry.   Neurological:      General: No focal deficit present.      Mental Status: He is alert and oriented to person, place, and time.   Psychiatric:         Mood and Affect: Mood normal.         Behavior: Behavior normal.          Last Recorded Vitals  /88   Pulse 71   Temp 36.7 °C (98.1 °F) (Temporal)   Wt 82.1 kg (181 lb)     Relevant Results      Pain Management Panel  More data may exist         Latest Ref Rng & Units 1/24/2024 10/25/2023   Pain Management Panel   Amphetamine Screen, Urine Presumptive Negative Presumptive Negative  Presumptive Negative    Barbiturate Screen, Urine Presumptive Negative Presumptive Negative  Presumptive Negative    Benzodiazepines Screen, Urine Presumptive Negative Presumptive Negative  Presumptive Negative    Fentanyl Screen, Urine Presumptive Negative Presumptive Negative  Presumptive Negative         ---------------------  XR HIP GENERAL 3V PELV/AP/LAT RT  Order: [id number]  Impression    IMPRESSION:    Mild right hip degenerative change.          : NIKKO    Transcribe Date/Time: Sep  2 2022  9:42A    Dictated by : STELLA RODRIGUEZ DO    This examination was interpreted and the report reviewed and  electronically signed by:   STELLA RODRIGUEZ DO on Sep  2 2022  9:44AM  EST  Narrative    * * *Final Report* * *    DATE OF EXAM: Sep  2 2022  9:07AM      AEX   5352  -  XR HIP 3V PELV+ AP/LAT RT  / ACCESSION #  [id number]    PROCEDURE REASON: RIGHT HIP PAIN        * * * * Physician Interpretation * * * *     EXAMINATION:  XR HIP 3V PELV+  AP/LAT RT    PATIENT/TECHNOLOGIST PROVIDED HISTORY:   RT HIP PAIN    CLINICAL INFORMATION:  RIGHT HIP PAIN    TECHNIQUE: Pelvis, 1 view; Right hip, 2 views    COMPARISON: None    RESULT:    Mild right hip joint space narrowing inferiorly.  No acute right hip  fracture or dislocation.    Partially evaluated left hip appears similar to the right.    Rounded ossification/enthesophyte adjacent to the right greater  trochanter.  Bony pelvis appears grossly intact.  Sacroiliac joints  appear relatively maintained.  Incompletely evaluated degenerative  changes of the lower lumbar spine.  ------------------      Assessment/Plan   Problem List Items Addressed This Visit             ICD-10-CM    Greater trochanteric bursitis of right hip - Primary M70.61    Relevant Orders    FL pain management TC    Inject Trigger Point, 1 or 2    Trigger point M79.10    Relevant Orders    Inject Trigger Point, 1 or 2     Other Visit Diagnoses         Codes    Medication management     Z79.899    Relevant Orders    Drug Screen, Urine With Reflex to Confirmation (Completed)                   1. Greater trochanteric bursitis of right hip  - FL pain management TC; Future  - Inject Trigger Point, 1 or 2; Future    2. Trigger point  - Inject Trigger Point, 1 or 2; Future    3. Medication management  - Drug Screen, Urine With Reflex to Confirmation; Future  - Drug Screen, Urine With Reflex to Confirmation       Plan/Follow-up Instructions:     You are scheduled for right trochanteric bursitis trigger point injection with fluoroscopy on 1/29/2024 in [place] with Dr. Rose.  No ibuprofen this day.  The office will call you for further instructions.  You will then be seen for your postprocedure follow-up in 6 to 8 weeks.      Disclaimer: This note was created using voice recognition software. It was not corrected for typographical or grammatical errors, inadvertent word insertion, or any unintended errors. Please feel free to contact me for  clarification.        Joleen Deras, GRANT, APRN, FNP-C   [facility]  Office #: [phone]  Fax # [phone]

## 2024-01-25 ENCOUNTER — TELEPHONE (OUTPATIENT)
Dept: OPERATING ROOM | Facility: HOSPITAL | Age: 79
End: 2024-01-25
Payer: MEDICARE

## 2024-01-29 ENCOUNTER — APPOINTMENT (OUTPATIENT)
Dept: PAIN MEDICINE | Facility: HOSPITAL | Age: 79
End: 2024-01-29
Payer: MEDICARE

## 2024-01-29 ENCOUNTER — HOSPITAL ENCOUNTER (OUTPATIENT)
Dept: RADIOLOGY | Facility: HOSPITAL | Age: 79
Discharge: HOME | End: 2024-01-29
Payer: MEDICARE

## 2024-01-29 ENCOUNTER — HOSPITAL ENCOUNTER (OUTPATIENT)
Dept: PAIN MEDICINE | Facility: HOSPITAL | Age: 79
Discharge: HOME | End: 2024-01-29
Payer: MEDICARE

## 2024-01-29 VITALS
RESPIRATION RATE: 18 BRPM | WEIGHT: 180 LBS | DIASTOLIC BLOOD PRESSURE: 84 MMHG | SYSTOLIC BLOOD PRESSURE: 158 MMHG | OXYGEN SATURATION: 97 % | HEIGHT: 68 IN | TEMPERATURE: 97.5 F | HEART RATE: 69 BPM | BODY MASS INDEX: 27.28 KG/M2

## 2024-01-29 DIAGNOSIS — M79.10 TRIGGER POINT: ICD-10-CM

## 2024-01-29 DIAGNOSIS — M70.61 GREATER TROCHANTERIC BURSITIS OF RIGHT HIP: ICD-10-CM

## 2024-01-29 PROCEDURE — 20605 DRAIN/INJ JOINT/BURSA W/O US: CPT | Performed by: ANESTHESIOLOGY

## 2024-01-29 PROCEDURE — 20605 DRAIN/INJ JOINT/BURSA W/O US: CPT

## 2024-01-29 RX ORDER — PHENYLEPHRINE HCL 10 MG
500 TABLET ORAL DAILY
COMMUNITY

## 2024-01-29 ASSESSMENT — COLUMBIA-SUICIDE SEVERITY RATING SCALE - C-SSRS
1. IN THE PAST MONTH, HAVE YOU WISHED YOU WERE DEAD OR WISHED YOU COULD GO TO SLEEP AND NOT WAKE UP?: NO
2. HAVE YOU ACTUALLY HAD ANY THOUGHTS OF KILLING YOURSELF?: NO
6. HAVE YOU EVER DONE ANYTHING, STARTED TO DO ANYTHING, OR PREPARED TO DO ANYTHING TO END YOUR LIFE?: NO

## 2024-01-29 ASSESSMENT — PAIN SCALES - GENERAL: PAINLEVEL_OUTOF10: 7

## 2024-01-29 ASSESSMENT — PAIN - FUNCTIONAL ASSESSMENT: PAIN_FUNCTIONAL_ASSESSMENT: 0-10

## 2024-01-29 NOTE — DISCHARGE INSTRUCTIONS
Discharge Instructions:  Keep band-aid on for the next 24 hours.  No showering/bathing for the next 24 hours. You may notice soreness or increased pain in the area of your injection, which may continue for the first 48 hours.  Avoid driving or operating any heavy machinery until the next day after the procedure.  You should resume any medications and your regular diet after the procedure. Some of the side affects you may experience from the steroids are: Insomnia (inability to sleep), Increased sweating, Headaches, Increased fluid retention (swelling of your extremities), Increased appetite, Face flushing, If you are a diabetic, your blood sugars may go up.  Closely monitor your diet.  Your blood sugar should return to normal in a few days.  Complications: If the discomfort persists, apply moist heat to the area.  Serious complications are very uncommon but may include bleeding, infection or nerve damage. If sever pain, fever, redness or swelling near the injection site, have someone take you to the nearest emergency room to be evaluated for procedure complications or infection. Expectations: Local anesthetics wear off in several hours but duration of relief varies from individual to individual.  If you have any questions, please call the office at [phone].  If this is an emergency, please go to the nearest emergency room.

## 2024-01-29 NOTE — OP NOTE
Date: 2024  OR Location: CON NON-OR PROCEDURES    Name: Johan Humphreys, : 1945, Age: 78 y.o., MRN: [id number], Sex: male    Diagnosis   1. Greater trochanteric bursitis of right hip  Inject Trigger Point, 1 or 2    Inject Trigger Point, 1 or 2      2. Trigger point  Inject Trigger Point, 1 or 2    Inject Trigger Point, 1 or 2        Postop diagnosis: Same  Anesthesia: Local  Complications: None  Patient was identified as to his name date of birth and wristband.  Medical record number was identified and the patient voluntarily signed the consent form for greater trochanteric bursal injection on the right.  Procedure:  Patient was placed in the supine position.  His right hip and greater trochanter was prepped and draped in usual fashion.  Using fluoroscopic guidance I identified the inferior border of the greater trochanter and anesthetized the skin with 3 cc of 1% Xylocaine local via 25-gauge 1 and half inch sterile needle.  This was then followed by a 25-gauge 1-1/2 inch sterile needle which performed a fanlike injection inferior to the greater trochanter on the right distal and anterior to posterior subcutaneous injection.  Negative aspiration was noted and there was no paresthesias or fluid noted.  A total of 4 cc of 2% MPF Xylocaine was then injected along with 20 mg of Celestone.  The needle was removed and a sterile bandage applied over the puncture site.  Patient tolerated the procedure well and was taken recovery room in awake stable condition with no neurologic deficit.  He has a follow-up visit scheduled in 1 month.

## 2024-02-14 ENCOUNTER — APPOINTMENT (OUTPATIENT)
Dept: PAIN MEDICINE | Facility: HOSPITAL | Age: 79
End: 2024-02-14
Payer: MEDICARE

## 2024-03-25 ENCOUNTER — APPOINTMENT (OUTPATIENT)
Dept: PAIN MEDICINE | Facility: HOSPITAL | Age: 79
End: 2024-03-25
Payer: MEDICARE

## 2024-06-25 NOTE — PROGRESS NOTES
Subjective   Patient ID: Johan Humphreys is a 78 y.o. male who presents for Pain (Post procedure follow up).    Johan is a pleasant 78-year-old  male who is here for postprocedure follow-up.  Patient is ambulatory.  Gait is steady.  He arrives with his spouse.    Patient had right L4-L5 and L5-S1 TFESI on 11/13/2023.  The procedure has improved his back pain, leg pain and hip pain.  He reports it provided 100% relief for the first 10 days then slowly wearing off.  He reports he had 80% for 3 weeks.  The injection has improved his pain, sleep, ability to participate in his daily activities, and ability to enjoy social activities.  He reports he was less grumpy as he is able to get a good night sleep.  He mentioned that before the injection he used to get a good sleep while in the recliner.  After the injection he was able to sleep in his own bed and did not suffer back and leg pain while turning to the sides.    Patient reports he continues to have right-sided leg pain that is mostly at night.  It comes and goes depending on his position.  He reports at night his pain is a 10.  It is tolerable in the morning.  He describes it as burning, sharp, shooting and stabbing kind of pain.  He continues to have numbness and stinging sensation to his right leg.  He has no pain, numbness or tingling sensation to his left leg.  He reports prolonged standing or walking aggravates his back and right leg pain.  He can only stand for 10 minutes and has to sit down.  Back and leg pain interferes with his daily activities.  Patient denies leg weakness or change in balance.  He denies bowel or bladder incontinence.  He denies recent falls, injuries, or ER visits.  There has been no change since he was last seen.    Patient takes Tylenol and ibuprofen for pain relief. He had physical therapy in the past and is continuing his home stretching exercise.      I discussed the plan of care including pharmacologic and joint interventional  Wound Procedure Treatment Posterior;Right Elbow    Performed by: Gianna Babin RN  Authorized by: Mirian Horvath MD    Associated wounds:   Wound 06/11/24 Elbow Posterior;Right  Wound cleansed with:  Wound cleanser  Applied primary dressing:  Polymem foam  Applied secondary dressing:  Gauze  Dressing secured with:  Fredis and Tubifast       procedure.  He is in agreement to repeating the transforaminal DAVID under fluoroscopy.  Questions were answered during this encounter.    VALERIE was done today and he scored 30.  This form was scanned and included in this note.    --------------  11/13/2023: Right L4-L5 and L5-S1 TFESI #1  --------        Past Medical History  He has a past medical history of High cholesterol and HTN (hypertension).    Surgical History  Past Surgical History:   Procedure Laterality Date    CATARACT EXTRACTION, BILATERAL Bilateral     ELBOW SURGERY      KNEE ARTHROSCOPY W/ DEBRIDEMENT Bilateral     ROTATOR CUFF REPAIR          Social History  He reports that he has quit smoking. His smoking use included cigarettes. He smoked an average of 2 packs per day. He has never used smokeless tobacco. He reports current alcohol use of about 24.0 standard drinks of alcohol per week. He reports that he does not use drugs.    Family History  No family history on file.     Allergies  Patient has no known allergies.      Current Outpatient Medications:     amLODIPine (Norvasc) 5 mg tablet, Take 1 tablet (5 mg) by mouth once daily., Disp: , Rfl:     atorvastatin (Lipitor) 20 mg tablet, Take 1 tablet (20 mg) by mouth once daily., Disp: , Rfl:     calcium carbonate-vitamin D3 (Oyster Shell) 250 mg-3.125 mcg (125 unit) tablet, Take 1 tablet by mouth 2 times a day with meals. Takes 5,000 units daily, Disp: , Rfl:     dorzolamide-timoloL (Cosopt) 22.3-6.8 mg/mL ophthalmic solution, Administer 1 drop into both eyes 2 times a day., Disp: , Rfl:     losartan (Cozaar) 50 mg tablet, Take 2 tablets (100 mg) by mouth once daily., Disp: , Rfl:     travoprost (Travatan Z) 0.004 % drops ophthalmic solution, Administer 1 drop into both eyes once daily at bedtime., Disp: , Rfl:     gabapentin (Neurontin) 100 mg capsule, Take 1 capsule (100 mg) by mouth once daily at bedtime., Disp: 90 capsule, Rfl: 0  No current facility-administered medications for this visit.      Review of Systems   Constitutional: Negative.    HENT: Negative.     Eyes: Negative.    Respiratory: Negative.     Cardiovascular: Negative.    Gastrointestinal: Negative.    Endocrine: Negative.    Genitourinary: Negative.    Musculoskeletal:  Positive for arthralgias, back pain and myalgias. Negative for gait problem, joint swelling, neck pain and neck stiffness.   Skin: Negative.    Allergic/Immunologic: Negative.    Neurological: Negative.    Psychiatric/Behavioral: Negative.          Physical Exam  Vitals and nursing note reviewed.   HENT:      Head: Normocephalic.      Nose: Nose normal.   Eyes:      Extraocular Movements: Extraocular movements intact.      Conjunctiva/sclera: Conjunctivae normal.      Pupils: Pupils are equal, round, and reactive to light.   Cardiovascular:      Rate and Rhythm: Normal rate and regular rhythm.   Pulmonary:      Effort: Pulmonary effort is normal.      Breath sounds: Normal breath sounds.   Musculoskeletal:         General: Tenderness present. No swelling, deformity or signs of injury.      Cervical back: No rigidity or tenderness.      Lumbar back: Tenderness present.      Right lower leg: No edema.      Left lower leg: No edema.      Comments: Positive right leg raise eliciting back pain.  Negative left leg raise.  Positive for minimal paraspinal tenderness at the lumbar region bilaterally at L4-L5, L5-S1 with rotation.  With right-sided radicular symptoms that follows L4, L5 and S1 distribution.  BUE 5/5, LLE 5/5, RLE 4/5.   Skin:     General: Skin is warm and dry.   Neurological:      General: No focal deficit present.      Mental Status: He is alert and oriented to person, place, and time.   Psychiatric:         Mood and Affect: Mood normal.         Behavior: Behavior normal.          Last Recorded Vitals  /85   Pulse 77   Temp 36.4 °C (97.5 °F) (Temporal)   Wt 81.2 kg (179 lb)     Relevant Results      Pain Management Panel          Latest Ref Rng & Units  10/25/2023   Pain Management Panel   Amphetamine Screen, Urine Presumptive Negative Presumptive Negative    Barbiturate Screen, Urine Presumptive Negative Presumptive Negative    Benzodiazepines Screen, Urine Presumptive Negative Presumptive Negative    Fentanyl Screen, Urine Presumptive Negative Presumptive Negative              Media Information          ------------------  MRI LUMBAR SPINE WO IVCON  Order: [id number]  Impression    IMPRESSION:    Lumbar spondylosis without high-grade canal or foraminal compromise, as  detailed.    Anatomic Lumbar Variant: None. L4-5 is considered the level of the iliac  crest and assume there are 5 lumbar-type vertebrae.          : PSCB    Transcribe Date/Time: Sep 19 2023  1:16P    Dictated by : MARSHA MARINA MD    This examination was interpreted and the report reviewed and  electronically signed by:  MARSHA MARINA MD on Sep 19 2023  1:21PM  EST  Narrative    * * *Final Report* * *    DATE OF EXAM: Sep 19 2023  1:12PM      [facility]   0303  -  MRI LUMBAR SPINE WO IVCON  / ACCESSION #  [id number]    PROCEDURE REASON: LUMBAR DEGENERATIVE DISC DISEASE        * * * * Physician Interpretation * * * *     MRI LUMBAR SPINE WO IVCON    CLINICAL HISTORY:  LUMBAR DEGENERATIVE DISC DISEASE    TECHNIQUE:  MRI lumbar spine routine protocol without contrast.  MQ: [id number]    COMPARISON:  None.    RESULT:    Counting reference:  Lumbosacral junction. For the purposes of this  report, L4-5 is considered the level of the iliac crest and assume there  are 5 lumbar-type vertebrae.  Anatomic variant:  None.    Localizer images: Approximately 2.5 cm circumscribed T2 hyperintense  lesion in the right hepatic lobe. Few right-sided renal cysts.    Alignment:  Straightening of the cervical lordosis. Multilevel mild to  moderate intervertebral disc space narrowing.    Bone marrow signal/fracture: Type II degenerative endplate changes at  L2-3 and L3-4. No evidence of confluent  abnormal marrow replacement or an  acute fracture.    Conus:  The conus terminates at L1 and is within normal limits of caliber  and morphology. Normal course and caliber of the descending cauda equina  nerve roots.    Soft tissues:  Paraspinal soft tissues are within normal limits.    T11-T12:  Canal and foramina are patent.    T12-L1:  Canal and foramina are patent.    L1-L2:  Annular bulging and facet and ligamentous hypertrophy without  significant canal or foraminal compromise.    L2-L3:  Diffuse disc bulging with superimposed broad-based shallow disc  protrusion, facet and ligamentous hypertrophy; mild canal stenosis with  narrowing of each subarticular zone, mild bilateral foraminal stenosis.    L3-L4:  Diffuse disc bulging with superimposed broad-based shallow disc  protrusion, facet and ligamentous hypertrophy; patent canal with  narrowing of each subarticular zone, mild bilateral foraminal stenosis.    L4-L5:  Diffuse disc bulging with superimposed broad-based shallow disc  protrusion, facet and ligamentous hypertrophy; patent canal with  narrowing of each subarticular zone, moderate bilateral foraminal  stenosis.    L5-S1:  Shallow disc bulging, facet hypertrophy; patent canal, moderate  right and mild left foraminal stenosis.    Sacrum and iliac wings:  The visualized sacrum and iliac wings are within  normal limits.  The presacral soft tissues are normal in appearance.  ----------------------      Assessment/Plan   Problem List Items Addressed This Visit             ICD-10-CM    Lumbar radiculopathy, chronic - Primary M54.16    Relevant Medications    gabapentin (Neurontin) 100 mg capsule    Other Relevant Orders    Transforaminal    Lumbar stenosis with neurogenic claudication M48.062    Relevant Medications    gabapentin (Neurontin) 100 mg capsule    Other Relevant Orders    Transforaminal    Facet arthropathy, lumbosacral M47.817    Relevant Medications    gabapentin (Neurontin) 100 mg capsule               1. Lumbar radiculopathy, chronic  - gabapentin (Neurontin) 100 mg capsule; Take 1 capsule (100 mg) by mouth once daily at bedtime.  Dispense: 90 capsule; Refill: 0  - Transforaminal; Future    2. Lumbar stenosis with neurogenic claudication  - gabapentin (Neurontin) 100 mg capsule; Take 1 capsule (100 mg) by mouth once daily at bedtime.  Dispense: 90 capsule; Refill: 0  - Transforaminal; Future    3. Facet arthropathy, lumbosacral  - gabapentin (Neurontin) 100 mg capsule; Take 1 capsule (100 mg) by mouth once daily at bedtime.  Dispense: 90 capsule; Refill: 0       Plan/Follow-up Instructions:      started you on gabapentin and you may take 100 mg at bedtime.    Continue taking ibuprofen as needed for pain relief.    You are scheduled for right L4-L5 and L5-S1 TFESI #2 on 12/18/2023 in [place] with Dr. Rose.  No ibuprofen on this day.  If getting sedation then you must not eat or drink 6 hours before the procedure and you must have a  with you.  The office will call you for further instructions.  I will then see you for your postprocedure follow-up in 4 to 6 weeks.      Disclaimer: This note was created using voice recognition software. It was not corrected for typographical or grammatical errors, inadvertent word insertion, or any unintended errors. Please feel free to contact me for clarification.        Joleen Deras, GRANT, APRN, FNP-C   [facility]  Office #: [phone]  Fax # [phone]